# Patient Record
Sex: MALE | Race: WHITE | Employment: OTHER | URBAN - METROPOLITAN AREA
[De-identification: names, ages, dates, MRNs, and addresses within clinical notes are randomized per-mention and may not be internally consistent; named-entity substitution may affect disease eponyms.]

---

## 2017-04-20 PROBLEM — E78.2 MIXED HYPERLIPIDEMIA: Status: ACTIVE | Noted: 2017-04-20

## 2017-04-20 PROBLEM — M54.50 CHRONIC MIDLINE LOW BACK PAIN WITHOUT SCIATICA: Status: ACTIVE | Noted: 2017-04-20

## 2017-04-20 PROBLEM — G89.29 CHRONIC MIDLINE LOW BACK PAIN WITHOUT SCIATICA: Status: ACTIVE | Noted: 2017-04-20

## 2017-04-20 PROBLEM — E03.4 HYPOTHYROIDISM DUE TO ACQUIRED ATROPHY OF THYROID: Status: ACTIVE | Noted: 2017-04-20

## 2017-04-20 PROBLEM — N52.9 ERECTILE DYSFUNCTION: Status: ACTIVE | Noted: 2017-04-20

## 2017-07-15 PROBLEM — E11.42 TYPE 2 DIABETES MELLITUS WITH DIABETIC POLYNEUROPATHY, WITHOUT LONG-TERM CURRENT USE OF INSULIN (HCC): Status: ACTIVE | Noted: 2017-07-15

## 2018-05-09 ENCOUNTER — OFFICE VISIT (OUTPATIENT)
Dept: FAMILY MEDICINE CLINIC | Age: 78
End: 2018-05-09
Payer: MEDICARE

## 2018-05-09 VITALS
TEMPERATURE: 97.5 F | HEIGHT: 74 IN | RESPIRATION RATE: 18 BRPM | HEART RATE: 90 BPM | SYSTOLIC BLOOD PRESSURE: 134 MMHG | WEIGHT: 213.8 LBS | BODY MASS INDEX: 27.44 KG/M2 | DIASTOLIC BLOOD PRESSURE: 80 MMHG

## 2018-05-09 DIAGNOSIS — I10 BENIGN HYPERTENSION: ICD-10-CM

## 2018-05-09 DIAGNOSIS — N28.9 RENAL INSUFFICIENCY: ICD-10-CM

## 2018-05-09 DIAGNOSIS — E78.2 MIXED HYPERLIPIDEMIA: ICD-10-CM

## 2018-05-09 DIAGNOSIS — K76.0 FATTY LIVER: ICD-10-CM

## 2018-05-09 DIAGNOSIS — E11.42 TYPE 2 DIABETES MELLITUS WITH DIABETIC POLYNEUROPATHY, WITHOUT LONG-TERM CURRENT USE OF INSULIN (HCC): Primary | ICD-10-CM

## 2018-05-09 DIAGNOSIS — G89.29 CHRONIC MIDLINE LOW BACK PAIN WITHOUT SCIATICA: ICD-10-CM

## 2018-05-09 DIAGNOSIS — E03.4 HYPOTHYROIDISM DUE TO ACQUIRED ATROPHY OF THYROID: ICD-10-CM

## 2018-05-09 DIAGNOSIS — M54.50 CHRONIC MIDLINE LOW BACK PAIN WITHOUT SCIATICA: ICD-10-CM

## 2018-05-09 DIAGNOSIS — Z76.89 ESTABLISHING CARE WITH NEW DOCTOR, ENCOUNTER FOR: ICD-10-CM

## 2018-05-09 PROCEDURE — G8427 DOCREV CUR MEDS BY ELIG CLIN: HCPCS | Performed by: FAMILY MEDICINE

## 2018-05-09 PROCEDURE — 99203 OFFICE O/P NEW LOW 30 MIN: CPT | Performed by: FAMILY MEDICINE

## 2018-05-09 PROCEDURE — G8417 CALC BMI ABV UP PARAM F/U: HCPCS | Performed by: FAMILY MEDICINE

## 2018-05-09 PROCEDURE — 1123F ACP DISCUSS/DSCN MKR DOCD: CPT | Performed by: FAMILY MEDICINE

## 2018-05-09 PROCEDURE — 4040F PNEUMOC VAC/ADMIN/RCVD: CPT | Performed by: FAMILY MEDICINE

## 2018-05-09 PROCEDURE — 1036F TOBACCO NON-USER: CPT | Performed by: FAMILY MEDICINE

## 2018-05-09 RX ORDER — CALCIUM CARBONATE 500(1250)
500 TABLET ORAL DAILY
COMMUNITY

## 2018-05-09 ASSESSMENT — PATIENT HEALTH QUESTIONNAIRE - PHQ9
SUM OF ALL RESPONSES TO PHQ9 QUESTIONS 1 & 2: 0
2. FEELING DOWN, DEPRESSED OR HOPELESS: 0
SUM OF ALL RESPONSES TO PHQ QUESTIONS 1-9: 0
1. LITTLE INTEREST OR PLEASURE IN DOING THINGS: 0

## 2018-05-09 ASSESSMENT — ENCOUNTER SYMPTOMS
NAUSEA: 0
SHORTNESS OF BREATH: 0
EYES NEGATIVE: 1
ABDOMINAL PAIN: 0
CONSTIPATION: 0
COUGH: 0
DIARRHEA: 0

## 2018-05-10 DIAGNOSIS — E11.42 TYPE 2 DIABETES MELLITUS WITH DIABETIC POLYNEUROPATHY, WITHOUT LONG-TERM CURRENT USE OF INSULIN (HCC): ICD-10-CM

## 2018-05-10 DIAGNOSIS — E03.4 HYPOTHYROIDISM DUE TO ACQUIRED ATROPHY OF THYROID: ICD-10-CM

## 2018-05-10 DIAGNOSIS — I10 BENIGN HYPERTENSION: ICD-10-CM

## 2018-05-10 DIAGNOSIS — N28.9 RENAL INSUFFICIENCY: ICD-10-CM

## 2018-05-10 LAB
ALBUMIN SERPL-MCNC: 4.5 G/DL (ref 3.9–4.9)
ALP BLD-CCNC: 31 U/L (ref 35–104)
ALT SERPL-CCNC: 62 U/L (ref 0–41)
ANION GAP SERPL CALCULATED.3IONS-SCNC: 17 MEQ/L (ref 7–13)
AST SERPL-CCNC: 69 U/L (ref 0–40)
BASOPHILS ABSOLUTE: 0 K/UL (ref 0–0.2)
BASOPHILS RELATIVE PERCENT: 1 %
BILIRUB SERPL-MCNC: 0.9 MG/DL (ref 0–1.2)
BUN BLDV-MCNC: 30 MG/DL (ref 8–23)
CALCIUM SERPL-MCNC: 9.5 MG/DL (ref 8.6–10.2)
CHLORIDE BLD-SCNC: 97 MEQ/L (ref 98–107)
CHOLESTEROL, TOTAL: 234 MG/DL (ref 0–199)
CO2: 22 MEQ/L (ref 22–29)
CREAT SERPL-MCNC: 0.94 MG/DL (ref 0.7–1.2)
CREATININE URINE: 129.9 MG/DL
EOSINOPHILS ABSOLUTE: 0.3 K/UL (ref 0–0.7)
EOSINOPHILS RELATIVE PERCENT: 5.6 %
GFR AFRICAN AMERICAN: >60
GFR NON-AFRICAN AMERICAN: >60
GLOBULIN: 2.2 G/DL (ref 2.3–3.5)
GLUCOSE BLD-MCNC: 247 MG/DL (ref 74–109)
HBA1C MFR BLD: 9.5 % (ref 4.8–5.9)
HCT VFR BLD CALC: 40.5 % (ref 42–52)
HDLC SERPL-MCNC: 22 MG/DL (ref 40–59)
HEMOGLOBIN: 13.9 G/DL (ref 14–18)
LDL CHOLESTEROL CALCULATED: ABNORMAL MG/DL (ref 0–129)
LYMPHOCYTES ABSOLUTE: 1.6 K/UL (ref 1–4.8)
LYMPHOCYTES RELATIVE PERCENT: 31.6 %
MCH RBC QN AUTO: 31.3 PG (ref 27–31.3)
MCHC RBC AUTO-ENTMCNC: 34.4 % (ref 33–37)
MCV RBC AUTO: 91 FL (ref 80–100)
MICROALBUMIN UR-MCNC: <1.2 MG/DL
MICROALBUMIN/CREAT UR-RTO: NORMAL MG/G (ref 0–30)
MONOCYTES ABSOLUTE: 0.4 K/UL (ref 0.2–0.8)
MONOCYTES RELATIVE PERCENT: 8.8 %
NEUTROPHILS ABSOLUTE: 2.7 K/UL (ref 1.4–6.5)
NEUTROPHILS RELATIVE PERCENT: 53 %
PDW BLD-RTO: 13.3 % (ref 11.5–14.5)
PLATELET # BLD: 137 K/UL (ref 130–400)
POTASSIUM SERPL-SCNC: 4.8 MEQ/L (ref 3.5–5.1)
RBC # BLD: 4.45 M/UL (ref 4.7–6.1)
SODIUM BLD-SCNC: 136 MEQ/L (ref 132–144)
T4 FREE: 1.46 NG/DL (ref 0.93–1.7)
TOTAL PROTEIN: 6.7 G/DL (ref 6.4–8.1)
TRIGL SERPL-MCNC: 528 MG/DL (ref 0–200)
TSH SERPL DL<=0.05 MIU/L-ACNC: 3.91 UIU/ML (ref 0.27–4.2)
WBC # BLD: 5.1 K/UL (ref 4.8–10.8)

## 2018-05-11 RX ORDER — PIOGLITAZONEHYDROCHLORIDE 30 MG/1
30 TABLET ORAL DAILY
Qty: 30 TABLET | Refills: 3 | Status: SHIPPED | OUTPATIENT
Start: 2018-05-11 | End: 2018-06-27 | Stop reason: SDUPTHER

## 2018-05-18 RX ORDER — FENOFIBRATE 145 MG/1
145 TABLET, COATED ORAL DAILY
Qty: 90 TABLET | Refills: 1 | Status: SHIPPED | OUTPATIENT
Start: 2018-05-18 | End: 2018-08-24 | Stop reason: SDUPTHER

## 2018-05-29 RX ORDER — ALENDRONATE SODIUM 70 MG/1
70 TABLET ORAL
Qty: 12 TABLET | Refills: 0 | Status: SHIPPED | OUTPATIENT
Start: 2018-05-29 | End: 2018-08-22 | Stop reason: SDUPTHER

## 2018-06-12 ENCOUNTER — OFFICE VISIT (OUTPATIENT)
Dept: FAMILY MEDICINE CLINIC | Age: 78
End: 2018-06-12
Payer: MEDICARE

## 2018-06-12 VITALS
HEART RATE: 89 BPM | SYSTOLIC BLOOD PRESSURE: 134 MMHG | WEIGHT: 213.38 LBS | OXYGEN SATURATION: 97 % | HEIGHT: 74 IN | TEMPERATURE: 96.2 F | RESPIRATION RATE: 12 BRPM | DIASTOLIC BLOOD PRESSURE: 78 MMHG | BODY MASS INDEX: 27.39 KG/M2

## 2018-06-12 DIAGNOSIS — I10 BENIGN HYPERTENSION: ICD-10-CM

## 2018-06-12 DIAGNOSIS — E78.2 MIXED HYPERLIPIDEMIA: ICD-10-CM

## 2018-06-12 DIAGNOSIS — E11.42 TYPE 2 DIABETES MELLITUS WITH DIABETIC POLYNEUROPATHY, WITHOUT LONG-TERM CURRENT USE OF INSULIN (HCC): Primary | ICD-10-CM

## 2018-06-12 PROCEDURE — G8417 CALC BMI ABV UP PARAM F/U: HCPCS | Performed by: FAMILY MEDICINE

## 2018-06-12 PROCEDURE — G8427 DOCREV CUR MEDS BY ELIG CLIN: HCPCS | Performed by: FAMILY MEDICINE

## 2018-06-12 PROCEDURE — 1123F ACP DISCUSS/DSCN MKR DOCD: CPT | Performed by: FAMILY MEDICINE

## 2018-06-12 PROCEDURE — 1036F TOBACCO NON-USER: CPT | Performed by: FAMILY MEDICINE

## 2018-06-12 PROCEDURE — 99213 OFFICE O/P EST LOW 20 MIN: CPT | Performed by: FAMILY MEDICINE

## 2018-06-12 PROCEDURE — 4040F PNEUMOC VAC/ADMIN/RCVD: CPT | Performed by: FAMILY MEDICINE

## 2018-06-12 ASSESSMENT — ENCOUNTER SYMPTOMS
SHORTNESS OF BREATH: 0
NAUSEA: 0
CONSTIPATION: 0
EYES NEGATIVE: 1
COUGH: 0
ABDOMINAL PAIN: 0
DIARRHEA: 0

## 2018-06-27 RX ORDER — PIOGLITAZONEHYDROCHLORIDE 30 MG/1
30 TABLET ORAL DAILY
Qty: 90 TABLET | Refills: 1 | Status: SHIPPED | OUTPATIENT
Start: 2018-06-27 | End: 2018-12-17 | Stop reason: SDUPTHER

## 2018-06-27 RX ORDER — LEVOTHYROXINE SODIUM 0.07 MG/1
75 TABLET ORAL DAILY
Qty: 90 TABLET | Refills: 1 | Status: SHIPPED | OUTPATIENT
Start: 2018-06-27 | End: 2018-07-24 | Stop reason: SDUPTHER

## 2018-07-24 RX ORDER — LEVOTHYROXINE SODIUM 0.07 MG/1
75 TABLET ORAL DAILY
Qty: 90 TABLET | Refills: 1 | Status: SHIPPED | OUTPATIENT
Start: 2018-07-24 | End: 2019-03-27 | Stop reason: SDUPTHER

## 2018-07-24 NOTE — TELEPHONE ENCOUNTER
Medication:  Levothyroxine 75 mcg    Last Office Visit: 6/12/18    Last Labs: 5/10/18    Last Filled: 6/27/18          Preferred pharmacy [facility]

## 2018-08-01 ENCOUNTER — OFFICE VISIT (OUTPATIENT)
Dept: FAMILY MEDICINE CLINIC | Age: 78
End: 2018-08-01
Payer: MEDICARE

## 2018-08-01 VITALS
TEMPERATURE: 96.8 F | HEART RATE: 60 BPM | SYSTOLIC BLOOD PRESSURE: 110 MMHG | RESPIRATION RATE: 16 BRPM | HEIGHT: 74 IN | DIASTOLIC BLOOD PRESSURE: 70 MMHG | BODY MASS INDEX: 28.49 KG/M2 | WEIGHT: 222 LBS

## 2018-08-01 DIAGNOSIS — S39.012A STRAIN OF LUMBAR REGION, INITIAL ENCOUNTER: Primary | ICD-10-CM

## 2018-08-01 DIAGNOSIS — M54.50 CHRONIC MIDLINE LOW BACK PAIN WITHOUT SCIATICA: ICD-10-CM

## 2018-08-01 DIAGNOSIS — M15.9 PRIMARY OSTEOARTHRITIS INVOLVING MULTIPLE JOINTS: ICD-10-CM

## 2018-08-01 DIAGNOSIS — G89.29 CHRONIC MIDLINE LOW BACK PAIN WITHOUT SCIATICA: ICD-10-CM

## 2018-08-01 PROCEDURE — 4040F PNEUMOC VAC/ADMIN/RCVD: CPT | Performed by: FAMILY MEDICINE

## 2018-08-01 PROCEDURE — 1101F PT FALLS ASSESS-DOCD LE1/YR: CPT | Performed by: FAMILY MEDICINE

## 2018-08-01 PROCEDURE — 99213 OFFICE O/P EST LOW 20 MIN: CPT | Performed by: FAMILY MEDICINE

## 2018-08-01 PROCEDURE — G8427 DOCREV CUR MEDS BY ELIG CLIN: HCPCS | Performed by: FAMILY MEDICINE

## 2018-08-01 PROCEDURE — G8417 CALC BMI ABV UP PARAM F/U: HCPCS | Performed by: FAMILY MEDICINE

## 2018-08-01 PROCEDURE — 1123F ACP DISCUSS/DSCN MKR DOCD: CPT | Performed by: FAMILY MEDICINE

## 2018-08-01 PROCEDURE — 1036F TOBACCO NON-USER: CPT | Performed by: FAMILY MEDICINE

## 2018-08-01 RX ORDER — METHYLPREDNISOLONE 4 MG/1
TABLET ORAL
Qty: 1 KIT | Refills: 0 | Status: SHIPPED | OUTPATIENT
Start: 2018-08-01 | End: 2018-08-29 | Stop reason: ALTCHOICE

## 2018-08-01 ASSESSMENT — ENCOUNTER SYMPTOMS
COUGH: 0
DIARRHEA: 0
CONSTIPATION: 0
SHORTNESS OF BREATH: 0
ABDOMINAL PAIN: 0
EYES NEGATIVE: 1
NAUSEA: 0

## 2018-08-01 NOTE — PROGRESS NOTES
niacin 500 MG CR capsule Take 1,000 mg by mouth 2 times daily       metoprolol tartrate (LOPRESSOR) 25 MG tablet Take 1 tablet by mouth 2 times daily 60 tablet 0     No current facility-administered medications for this visit. PMH, Surgical Hx, Family Hx, and Social Hx reviewed and updated. Health Maintenance reviewed. Objective    Vitals:    08/01/18 1120   BP: 110/70   Pulse: 60   Resp: 16   Temp: 96.8 °F (36 °C)   TempSrc: Temporal   Weight: 222 lb (100.7 kg)   Height: 6' 2\" (1.88 m)     Physical Exam   Neck: Normal range of motion. Neck supple. Cardiovascular: Normal rate, regular rhythm and normal heart sounds. No murmur heard. Pulmonary/Chest: Effort normal and breath sounds normal. He has no wheezes. Abdominal: Soft. Bowel sounds are normal. There is no tenderness. Musculoskeletal: He exhibits no edema. Lumbar back: He exhibits pain. Low-back pain with range of motion. Says it increases when he stands for long periods of time. No significant radiculopathy. Neurological: He has normal reflexes. No numbness or tingling at this time       Assessment & Plan    Diagnosis Orders   1. Strain of lumbar region, initial encounter  methylPREDNISolone (MEDROL DOSEPACK) 4 MG tablet   2. Primary osteoarthritis involving multiple joints  methylPREDNISolone (MEDROL DOSEPACK) 4 MG tablet   3. Chronic midline low back pain without sciatica       No orders of the defined types were placed in this encounter. Orders Placed This Encounter   Medications    methylPREDNISolone (MEDROL DOSEPACK) 4 MG tablet     Sig: Take by mouth. Dispense:  1 kit     Refill:  0     There are no discontinued medications. No Follow-up on file. Long talk. Avoid activities that exacerbate symptoms  The importance of a good diet and exercise regimen discussed. Take medications as prescribed. Talked about going to physical therapy but he wishes to hold off. Range of motion exercises reviewed.   Follow up as instructed. Call if any problems           Lorena Briseno MD          Please note this report has been partially produced using speech recognition software  And may cause contain errors related to that system including grammar, punctuation and spelling as well as words and phrases that may seem inappropriate. If there are questions or concerns please feel free to contact me to clarify.

## 2018-08-22 RX ORDER — ALENDRONATE SODIUM 70 MG/1
TABLET ORAL
Qty: 12 TABLET | Refills: 1 | Status: SHIPPED | OUTPATIENT
Start: 2018-08-22 | End: 2019-02-06 | Stop reason: SDUPTHER

## 2018-08-24 RX ORDER — FENOFIBRATE 145 MG/1
145 TABLET, COATED ORAL DAILY
Qty: 90 TABLET | Refills: 1 | Status: SHIPPED | OUTPATIENT
Start: 2018-08-24

## 2018-08-24 NOTE — TELEPHONE ENCOUNTER
Medication: Fenofibrate 145 mg    Last Office Visit: 8/1/18    Last Labs: 5/10/18    Last Filled: 5/18/18      Medication: Metoprolol tartrate 25 mg    Last Office Visit: 8/1/18    Last Labs: 5/10/18    Last Filled: 3/20/18        Preferred pharmacy [facility]

## 2018-08-29 ENCOUNTER — OFFICE VISIT (OUTPATIENT)
Dept: FAMILY MEDICINE CLINIC | Age: 78
End: 2018-08-29
Payer: MEDICARE

## 2018-08-29 VITALS
RESPIRATION RATE: 16 BRPM | BODY MASS INDEX: 28.52 KG/M2 | SYSTOLIC BLOOD PRESSURE: 136 MMHG | DIASTOLIC BLOOD PRESSURE: 86 MMHG | TEMPERATURE: 96.4 F | WEIGHT: 222.2 LBS | HEIGHT: 74 IN | HEART RATE: 82 BPM

## 2018-08-29 DIAGNOSIS — E11.42 TYPE 2 DIABETES MELLITUS WITH DIABETIC POLYNEUROPATHY, WITHOUT LONG-TERM CURRENT USE OF INSULIN (HCC): ICD-10-CM

## 2018-08-29 DIAGNOSIS — G89.29 CHRONIC MIDLINE LOW BACK PAIN WITHOUT SCIATICA: Primary | ICD-10-CM

## 2018-08-29 DIAGNOSIS — M54.32 SCIATICA OF LEFT SIDE: ICD-10-CM

## 2018-08-29 DIAGNOSIS — M54.50 CHRONIC MIDLINE LOW BACK PAIN WITHOUT SCIATICA: Primary | ICD-10-CM

## 2018-08-29 DIAGNOSIS — G89.29 CHRONIC MIDLINE LOW BACK PAIN WITHOUT SCIATICA: ICD-10-CM

## 2018-08-29 DIAGNOSIS — M54.50 CHRONIC MIDLINE LOW BACK PAIN WITHOUT SCIATICA: ICD-10-CM

## 2018-08-29 PROCEDURE — 4040F PNEUMOC VAC/ADMIN/RCVD: CPT | Performed by: FAMILY MEDICINE

## 2018-08-29 PROCEDURE — 1123F ACP DISCUSS/DSCN MKR DOCD: CPT | Performed by: FAMILY MEDICINE

## 2018-08-29 PROCEDURE — 1101F PT FALLS ASSESS-DOCD LE1/YR: CPT | Performed by: FAMILY MEDICINE

## 2018-08-29 PROCEDURE — 1036F TOBACCO NON-USER: CPT | Performed by: FAMILY MEDICINE

## 2018-08-29 PROCEDURE — G8427 DOCREV CUR MEDS BY ELIG CLIN: HCPCS | Performed by: FAMILY MEDICINE

## 2018-08-29 PROCEDURE — 99213 OFFICE O/P EST LOW 20 MIN: CPT | Performed by: FAMILY MEDICINE

## 2018-08-29 PROCEDURE — G8417 CALC BMI ABV UP PARAM F/U: HCPCS | Performed by: FAMILY MEDICINE

## 2018-08-29 RX ORDER — PREDNISONE 10 MG/1
TABLET ORAL
Qty: 18 TABLET | Refills: 0 | Status: SHIPPED | OUTPATIENT
Start: 2018-08-29 | End: 2018-08-29 | Stop reason: SDUPTHER

## 2018-08-29 RX ORDER — PREDNISONE 10 MG/1
TABLET ORAL
Qty: 18 TABLET | Refills: 0 | Status: SHIPPED | OUTPATIENT
Start: 2018-08-29 | End: 2018-09-08

## 2018-08-29 ASSESSMENT — ENCOUNTER SYMPTOMS
CONSTIPATION: 0
SHORTNESS OF BREATH: 0
EYES NEGATIVE: 1
COUGH: 0
ABDOMINAL PAIN: 0
DIARRHEA: 0
NAUSEA: 0

## 2018-08-29 NOTE — PROGRESS NOTES
Pain Medicine     Number of Visits Requested:   1     Orders Placed This Encounter   Medications    predniSONE (DELTASONE) 10 MG tablet     Sig: TID FOR 5 DAYS THEN 1 DAILY FOR 3 DAYS     Dispense:  18 tablet     Refill:  0     Medications Discontinued During This Encounter   Medication Reason    methylPREDNISolone (MEDROL DOSEPACK) 4 MG tablet Therapy completed     No Follow-up on file. Long talk. Has been to physical therapy in the past.  Past x-rays reviewed with patient  The importance of a good diet and exercise regimen discussed. Take medications as prescribed. Treatment options reviewed. Wishes to start with pain management  Follow up as instructed. Call if any problems           Alvina Mccray MD          Please note this report has been partially produced using speech recognition software  And may cause contain errors related to that system including grammar, punctuation and spelling as well as words and phrases that may seem inappropriate. If there are questions or concerns please feel free to contact me to clarify.

## 2018-09-25 ENCOUNTER — OFFICE VISIT (OUTPATIENT)
Dept: FAMILY MEDICINE CLINIC | Age: 78
End: 2018-09-25
Payer: MEDICARE

## 2018-09-25 VITALS
SYSTOLIC BLOOD PRESSURE: 108 MMHG | WEIGHT: 223.6 LBS | DIASTOLIC BLOOD PRESSURE: 66 MMHG | RESPIRATION RATE: 12 BRPM | HEIGHT: 74 IN | HEART RATE: 56 BPM | TEMPERATURE: 96.7 F | BODY MASS INDEX: 28.7 KG/M2

## 2018-09-25 DIAGNOSIS — G89.29 CHRONIC MIDLINE LOW BACK PAIN WITHOUT SCIATICA: ICD-10-CM

## 2018-09-25 DIAGNOSIS — Z23 NEED FOR INFLUENZA VACCINATION: ICD-10-CM

## 2018-09-25 DIAGNOSIS — M54.50 CHRONIC MIDLINE LOW BACK PAIN WITHOUT SCIATICA: ICD-10-CM

## 2018-09-25 DIAGNOSIS — N28.9 RENAL INSUFFICIENCY: ICD-10-CM

## 2018-09-25 DIAGNOSIS — E11.42 TYPE 2 DIABETES MELLITUS WITH DIABETIC POLYNEUROPATHY, WITHOUT LONG-TERM CURRENT USE OF INSULIN (HCC): Primary | ICD-10-CM

## 2018-09-25 LAB — HBA1C MFR BLD: 6.1 %

## 2018-09-25 PROCEDURE — G0008 ADMIN INFLUENZA VIRUS VAC: HCPCS | Performed by: FAMILY MEDICINE

## 2018-09-25 PROCEDURE — 83036 HEMOGLOBIN GLYCOSYLATED A1C: CPT | Performed by: FAMILY MEDICINE

## 2018-09-25 PROCEDURE — 1123F ACP DISCUSS/DSCN MKR DOCD: CPT | Performed by: FAMILY MEDICINE

## 2018-09-25 PROCEDURE — G8417 CALC BMI ABV UP PARAM F/U: HCPCS | Performed by: FAMILY MEDICINE

## 2018-09-25 PROCEDURE — 99213 OFFICE O/P EST LOW 20 MIN: CPT | Performed by: FAMILY MEDICINE

## 2018-09-25 PROCEDURE — 4040F PNEUMOC VAC/ADMIN/RCVD: CPT | Performed by: FAMILY MEDICINE

## 2018-09-25 PROCEDURE — 90662 IIV NO PRSV INCREASED AG IM: CPT | Performed by: FAMILY MEDICINE

## 2018-09-25 PROCEDURE — 1036F TOBACCO NON-USER: CPT | Performed by: FAMILY MEDICINE

## 2018-09-25 PROCEDURE — 1101F PT FALLS ASSESS-DOCD LE1/YR: CPT | Performed by: FAMILY MEDICINE

## 2018-09-25 PROCEDURE — G8427 DOCREV CUR MEDS BY ELIG CLIN: HCPCS | Performed by: FAMILY MEDICINE

## 2018-09-25 ASSESSMENT — ENCOUNTER SYMPTOMS
EYES NEGATIVE: 1
SHORTNESS OF BREATH: 0
NAUSEA: 0
DIARRHEA: 0
COUGH: 0
CONSTIPATION: 0
ABDOMINAL PAIN: 0

## 2018-09-25 NOTE — PROGRESS NOTES
Subjective  Young Ferdinand, 66 y.o. male presents today with:  Chief Complaint   Patient presents with    Back Pain     Patient presents today for a follow-up on Back Pain that radiates into his hips, and legs. Was seen by Dr. Maryam Collins, and received Injections. States it seemed like a \"miracle\", but is slowly coming back now. HPI    Patient presents today Back pain. Saw pain management. Injection has helped quite a bit. Taking current medications which were reviewed. Problem list discussed. Blood sugar control has improved. Had 1 low blood sugar reading the past few weeks. Toxo cutting back on his Amaryl. Overall doing well. Has no other new problem / question. Health Maintenance Is Up-To-Date         No other questions and or concerns for today's visit      Review of Systems   Constitutional: Negative for activity change, appetite change, fatigue and fever. HENT: Negative for ear pain. Eyes: Negative. Respiratory: Negative for cough and shortness of breath. Cardiovascular: Negative for chest pain and palpitations. Gastrointestinal: Negative for abdominal pain, constipation, diarrhea and nausea. Genitourinary: Negative for dysuria and frequency. Neurological: Negative for dizziness and headaches.          Past Medical History:   Diagnosis Date    Abnormal LFTs     BPH without urinary obstruction     Cancer (HCC)     skin cancer, rt side of neck excised    Colonic polyp     Essential hypertension, benign     Gout     Hyperlipidemia     Non-alcoholic fatty liver disease     Peripheral neuropathy     Renal insufficiency     Tinnitus     Type II or unspecified type diabetes mellitus without mention of complication, not stated as uncontrolled      Past Surgical History:   Procedure Laterality Date    APPENDECTOMY      COLONOSCOPY  11 12 2013    colon polyps, diverticulosis, hemorrhoids    FRACTURE SURGERY      rt femur    HERNIA REPAIR      inguinal hernia tablet Take 1 tablet by mouth 2 times daily 180 tablet 0     No current facility-administered medications for this visit. PMH, Surgical Hx, Family Hx, and Social Hx reviewed and updated. Health Maintenance reviewed. Objective    Vitals:    09/25/18 1050   BP: 108/66   Pulse: 56   Resp: 12   Temp: 96.7 °F (35.9 °C)   TempSrc: Temporal   Weight: 223 lb 9.6 oz (101.4 kg)   Height: 6' 2\" (1.88 m)       Physical Exam   HENT:   Nose: Nose normal.   Mouth/Throat: Oropharynx is clear and moist.   Eyes: Pupils are equal, round, and reactive to light. Conjunctivae are normal.   Neck: Normal range of motion. Neck supple. Cardiovascular: Normal rate, regular rhythm and normal heart sounds. No murmur heard. Pulmonary/Chest: Effort normal and breath sounds normal. He has no wheezes. Abdominal: Soft. Bowel sounds are normal. He exhibits no mass. There is no tenderness. Musculoskeletal: He exhibits no edema. Lumbar back: He exhibits pain. Lymphadenopathy:     He has no cervical adenopathy. Neurological: He has normal reflexes. Assessment & Plan    Diagnosis Orders   1. Type 2 diabetes mellitus with diabetic polyneuropathy, without long-term current use of insulin (HCC)  POCT glycosylated hemoglobin (Hb A1C)   2. Need for influenza vaccination  INFLUENZA, HIGH DOSE, 65 YRS +, IM, PF, PREFILL SYR, 0.5ML (FLUZONE HD)   3. Renal insufficiency     4. Chronic midline low back pain without sciatica       Orders Placed This Encounter   Procedures    INFLUENZA, HIGH DOSE, 65 YRS +, IM, PF, PREFILL SYR, 0.5ML (FLUZONE HD)    POCT glycosylated hemoglobin (Hb A1C)     No orders of the defined types were placed in this encounter. There are no discontinued medications. No Follow-up on file. Long talk. The importance of a good diet and exercise regimen discussed. Take medications as prescribed.   We'll decrease Amaryl to 2 pills daily  Continue to monitor blood sugar  Continue to follow-up with

## 2018-12-17 RX ORDER — PIOGLITAZONEHYDROCHLORIDE 30 MG/1
TABLET ORAL
Qty: 90 TABLET | Refills: 1 | Status: SHIPPED | OUTPATIENT
Start: 2018-12-17

## 2019-02-06 RX ORDER — ALENDRONATE SODIUM 70 MG/1
TABLET ORAL
Qty: 12 TABLET | Refills: 1 | Status: SHIPPED | OUTPATIENT
Start: 2019-02-06

## 2019-02-27 ENCOUNTER — OFFICE VISIT (OUTPATIENT)
Dept: FAMILY MEDICINE CLINIC | Age: 79
End: 2019-02-27
Payer: MEDICARE

## 2019-02-27 VITALS
WEIGHT: 227 LBS | TEMPERATURE: 98 F | HEART RATE: 84 BPM | RESPIRATION RATE: 16 BRPM | BODY MASS INDEX: 28.23 KG/M2 | OXYGEN SATURATION: 98 % | SYSTOLIC BLOOD PRESSURE: 138 MMHG | HEIGHT: 75 IN | DIASTOLIC BLOOD PRESSURE: 68 MMHG

## 2019-02-27 DIAGNOSIS — M54.50 CHRONIC MIDLINE LOW BACK PAIN WITHOUT SCIATICA: Primary | ICD-10-CM

## 2019-02-27 DIAGNOSIS — I10 BENIGN HYPERTENSION: ICD-10-CM

## 2019-02-27 DIAGNOSIS — E11.42 TYPE 2 DIABETES MELLITUS WITH DIABETIC POLYNEUROPATHY, WITHOUT LONG-TERM CURRENT USE OF INSULIN (HCC): ICD-10-CM

## 2019-02-27 DIAGNOSIS — G89.29 CHRONIC MIDLINE LOW BACK PAIN WITHOUT SCIATICA: Primary | ICD-10-CM

## 2019-02-27 DIAGNOSIS — E03.4 HYPOTHYROIDISM DUE TO ACQUIRED ATROPHY OF THYROID: ICD-10-CM

## 2019-02-27 DIAGNOSIS — M15.9 PRIMARY OSTEOARTHRITIS INVOLVING MULTIPLE JOINTS: ICD-10-CM

## 2019-02-27 PROCEDURE — 4040F PNEUMOC VAC/ADMIN/RCVD: CPT | Performed by: FAMILY MEDICINE

## 2019-02-27 PROCEDURE — 1123F ACP DISCUSS/DSCN MKR DOCD: CPT | Performed by: FAMILY MEDICINE

## 2019-02-27 PROCEDURE — G8427 DOCREV CUR MEDS BY ELIG CLIN: HCPCS | Performed by: FAMILY MEDICINE

## 2019-02-27 PROCEDURE — 1101F PT FALLS ASSESS-DOCD LE1/YR: CPT | Performed by: FAMILY MEDICINE

## 2019-02-27 PROCEDURE — 1036F TOBACCO NON-USER: CPT | Performed by: FAMILY MEDICINE

## 2019-02-27 PROCEDURE — G8482 FLU IMMUNIZE ORDER/ADMIN: HCPCS | Performed by: FAMILY MEDICINE

## 2019-02-27 PROCEDURE — 99213 OFFICE O/P EST LOW 20 MIN: CPT | Performed by: FAMILY MEDICINE

## 2019-02-27 PROCEDURE — G8417 CALC BMI ABV UP PARAM F/U: HCPCS | Performed by: FAMILY MEDICINE

## 2019-02-27 RX ORDER — DICLOFENAC SODIUM 75 MG/1
75 TABLET, DELAYED RELEASE ORAL 2 TIMES DAILY
Qty: 60 TABLET | Refills: 3 | Status: SHIPPED | OUTPATIENT
Start: 2019-02-27

## 2019-02-27 ASSESSMENT — PATIENT HEALTH QUESTIONNAIRE - PHQ9
2. FEELING DOWN, DEPRESSED OR HOPELESS: 0
SUM OF ALL RESPONSES TO PHQ QUESTIONS 1-9: 0
1. LITTLE INTEREST OR PLEASURE IN DOING THINGS: 0
SUM OF ALL RESPONSES TO PHQ9 QUESTIONS 1 & 2: 0
SUM OF ALL RESPONSES TO PHQ QUESTIONS 1-9: 0

## 2019-03-27 RX ORDER — LEVOTHYROXINE SODIUM 0.07 MG/1
TABLET ORAL
Qty: 90 TABLET | Refills: 1 | Status: SHIPPED | OUTPATIENT
Start: 2019-03-27

## 2019-05-16 ENCOUNTER — TELEPHONE (OUTPATIENT)
Dept: ADMINISTRATIVE | Age: 79
End: 2019-05-16

## 2023-03-29 DIAGNOSIS — E03.9 HYPOTHYROIDISM, UNSPECIFIED TYPE: ICD-10-CM

## 2023-03-29 DIAGNOSIS — N40.0 BENIGN PROSTATIC HYPERPLASIA, UNSPECIFIED WHETHER LOWER URINARY TRACT SYMPTOMS PRESENT: ICD-10-CM

## 2023-03-29 RX ORDER — LEVOTHYROXINE SODIUM 88 UG/1
88 TABLET ORAL DAILY
Qty: 90 TABLET | Refills: 0 | Status: SHIPPED | OUTPATIENT
Start: 2023-03-29 | End: 2023-07-21

## 2023-03-29 RX ORDER — TAMSULOSIN HYDROCHLORIDE 0.4 MG/1
0.4 CAPSULE ORAL DAILY
Qty: 90 CAPSULE | Refills: 0 | Status: SHIPPED | OUTPATIENT
Start: 2023-03-29 | End: 2023-07-21

## 2023-03-29 RX ORDER — TAMSULOSIN HYDROCHLORIDE 0.4 MG/1
0.4 CAPSULE ORAL DAILY
COMMUNITY
Start: 2018-04-20 | End: 2023-03-29 | Stop reason: SDUPTHER

## 2023-03-29 RX ORDER — LEVOTHYROXINE SODIUM 88 UG/1
1 TABLET ORAL DAILY
COMMUNITY
Start: 2018-07-24 | End: 2023-03-29 | Stop reason: SDUPTHER

## 2023-07-11 DIAGNOSIS — N40.0 BENIGN PROSTATIC HYPERPLASIA, UNSPECIFIED WHETHER LOWER URINARY TRACT SYMPTOMS PRESENT: ICD-10-CM

## 2023-07-11 DIAGNOSIS — E03.9 HYPOTHYROIDISM, UNSPECIFIED TYPE: ICD-10-CM

## 2023-07-11 NOTE — TELEPHONE ENCOUNTER
LOV: 1/3/2023  Pt needs appt for further refills  Please ask if a short supply is needed  Thank you!

## 2023-07-21 RX ORDER — LEVOTHYROXINE SODIUM 88 UG/1
88 TABLET ORAL DAILY
Qty: 30 TABLET | Refills: 0 | Status: SHIPPED | OUTPATIENT
Start: 2023-07-21 | End: 2023-08-15 | Stop reason: SDUPTHER

## 2023-07-21 RX ORDER — TAMSULOSIN HYDROCHLORIDE 0.4 MG/1
0.4 CAPSULE ORAL DAILY
Qty: 30 CAPSULE | Refills: 0 | Status: SHIPPED | OUTPATIENT
Start: 2023-07-21 | End: 2023-08-15 | Stop reason: ALTCHOICE

## 2023-07-21 NOTE — TELEPHONE ENCOUNTER
SPOKE WITH PATIENT - SCHEDULED APPOINTMENT WITH DR. REEVES ON 8/7/23 - NEEDS SHORT SUPPLY SENT TO [facility].

## 2023-08-07 ENCOUNTER — APPOINTMENT (OUTPATIENT)
Dept: PRIMARY CARE | Facility: CLINIC | Age: 83
End: 2023-08-07
Payer: MEDICARE

## 2023-08-14 PROBLEM — E03.4 HYPOTHYROIDISM DUE TO ACQUIRED ATROPHY OF THYROID: Status: ACTIVE | Noted: 2017-04-20

## 2023-08-14 PROBLEM — M15.9 PRIMARY OSTEOARTHRITIS INVOLVING MULTIPLE JOINTS: Status: ACTIVE | Noted: 2023-08-14

## 2023-08-14 PROBLEM — F51.01 PRIMARY INSOMNIA: Status: ACTIVE | Noted: 2023-08-14

## 2023-08-14 PROBLEM — G57.92 NEURITIS OF LEFT FOOT: Status: ACTIVE | Noted: 2023-08-14

## 2023-08-14 PROBLEM — M62.838 MUSCLE SPASMS OF NECK: Status: ACTIVE | Noted: 2023-08-14

## 2023-08-14 PROBLEM — G57.91 NEURITIS OF RIGHT FOOT: Status: ACTIVE | Noted: 2023-08-14

## 2023-08-14 PROBLEM — M15.0 PRIMARY OSTEOARTHRITIS INVOLVING MULTIPLE JOINTS: Status: ACTIVE | Noted: 2023-08-14

## 2023-08-14 PROBLEM — I95.1 PRIMARY ORTHOSTATIC HYPOTENSION: Status: ACTIVE | Noted: 2023-08-14

## 2023-08-14 PROBLEM — G89.29 CHRONIC MIDLINE LOW BACK PAIN WITHOUT SCIATICA: Status: ACTIVE | Noted: 2017-04-20

## 2023-08-14 PROBLEM — F41.9 ANXIETY: Status: ACTIVE | Noted: 2023-08-14

## 2023-08-14 PROBLEM — G47.9 DIFFICULTY SLEEPING: Status: ACTIVE | Noted: 2023-08-14

## 2023-08-14 PROBLEM — E11.22 CKD STAGE 1 DUE TO TYPE 2 DIABETES MELLITUS (MULTI): Status: ACTIVE | Noted: 2023-08-14

## 2023-08-14 PROBLEM — E11.9 TYPE 2 DIABETES MELLITUS WITHOUT COMPLICATION, WITHOUT LONG-TERM CURRENT USE OF INSULIN (MULTI): Status: ACTIVE | Noted: 2023-08-14

## 2023-08-14 PROBLEM — R26.9 GAIT DISTURBANCE: Status: ACTIVE | Noted: 2023-08-14

## 2023-08-14 PROBLEM — N52.9 ERECTILE DYSFUNCTION: Status: ACTIVE | Noted: 2017-04-20

## 2023-08-14 PROBLEM — R42 DIZZINESS: Status: ACTIVE | Noted: 2023-08-14

## 2023-08-14 PROBLEM — I10 ESSENTIAL HYPERTENSION: Status: ACTIVE | Noted: 2023-08-14

## 2023-08-14 PROBLEM — G25.0 ESSENTIAL TREMOR: Status: ACTIVE | Noted: 2023-08-14

## 2023-08-14 PROBLEM — N18.1 CKD STAGE 1 DUE TO TYPE 2 DIABETES MELLITUS (MULTI): Status: ACTIVE | Noted: 2023-08-14

## 2023-08-14 PROBLEM — R97.20 ELEVATED PSA: Status: ACTIVE | Noted: 2023-08-14

## 2023-08-14 PROBLEM — M54.50 CHRONIC MIDLINE LOW BACK PAIN WITHOUT SCIATICA: Status: ACTIVE | Noted: 2017-04-20

## 2023-08-14 PROBLEM — M54.2 CERVICALGIA: Status: ACTIVE | Noted: 2023-08-14

## 2023-08-14 PROBLEM — Z96.652 STATUS POST TOTAL LEFT KNEE REPLACEMENT: Status: ACTIVE | Noted: 2023-08-14

## 2023-08-14 PROBLEM — M81.0 OSTEOPOROSIS: Status: ACTIVE | Noted: 2023-08-14

## 2023-08-14 RX ORDER — PIOGLITAZONEHYDROCHLORIDE 15 MG/1
1 TABLET ORAL DAILY
COMMUNITY
Start: 2019-09-17 | End: 2023-11-02 | Stop reason: WASHOUT

## 2023-08-14 RX ORDER — FENOFIBRATE 145 MG/1
1 TABLET, FILM COATED ORAL EVERY OTHER DAY
COMMUNITY
Start: 2018-08-24 | End: 2023-08-28

## 2023-08-14 RX ORDER — GABAPENTIN 100 MG/1
1 CAPSULE ORAL 3 TIMES DAILY
COMMUNITY
Start: 2022-02-21 | End: 2023-08-15 | Stop reason: ALTCHOICE

## 2023-08-14 RX ORDER — VALSARTAN 80 MG/1
1 TABLET ORAL DAILY
COMMUNITY
Start: 2022-06-03 | End: 2023-08-15 | Stop reason: ALTCHOICE

## 2023-08-14 RX ORDER — TRAZODONE HYDROCHLORIDE 50 MG/1
50 TABLET ORAL NIGHTLY
COMMUNITY
End: 2023-08-15 | Stop reason: SDUPTHER

## 2023-08-14 RX ORDER — FAMOTIDINE 20 MG/1
1 TABLET, FILM COATED ORAL DAILY
COMMUNITY
Start: 2022-06-28 | End: 2023-11-02 | Stop reason: WASHOUT

## 2023-08-14 NOTE — PROGRESS NOTES
Subjective   Patient ID: Dakota Manzano is a 83 y.o. male who presents for Hypertension, Hyperlipidemia, Medicare Annual Wellness Visit Subsequent, Diabetes, Shaking, Dizziness, cancer spots, and feet numbness.  HPI  Patient presents today for a Medicare AWV, and follow-up on Diabetes, Hypertension, and Hyperlipidemia. Does follow a low sugar, low sodium, and low fat diet. Does check his sugar at home, when he last checked it was 115. Is fasting for blood work.    Patient also presents in office today for his feet. Admits to losing feeling in his feet. Ongoing for a while. Admits that he has been to a podiatrist and this did not help. Admits that he try compression socks with no help. Was also prescribed gabapentin and this did not help either.     Patient in office being seen for a follow up on Insomnia. Currently taking trazodone. Last took last night. Reports that the medication is working 8/10. 80% managed with the medication. States there are no adverse effects.     Also presents in office today for dizziness. Admits that this has been ongoing for a while. Admits that he has been off his losartan for a while. Admits that when he stands up he does have to use his hands when he is standing up. Admits that when he was at [employer] his blood pressure changed so bad that he fell and passed out. Admits that this continues to happen. Admits that this has been happening since he was taken off the BP medication.     Would like to talk about shaking today. Ongoing for a while. Admits that this has gotten worse.     Also presents in office today for cancer spots. Admits that he has some located on his neck. Admits that this keeps getting caught on his clothes. Admits that it has been tearing and bleeding a lot. Admits that they would like to see a dermatologist to have this removed.     Also presents in office today for his memory. Admits that this has been getting worse within the last couple of months. Admits that he did  "see a neurologist. Admits that they would like to see another one.     Would like to talk about medications today.     Taking current medications which were reviewed.  Problem list discussed.    Overall doing well.  Eating okay.  Staying active.    Has no other new problem /question.    ROS  Constitutional- No activity change. No appetite change.  Eyes- Denies vision changes.  Respiratory- No shortness of breath.  Cardiovascular- No palpitations. No chest pain.  GI- No nausea or vomiting. No diarrhea or constipation. Denies abdominal pain.  Musculoskeletal- Denies joint swelling.  Extremities- No edema.  Neurological-weakness, dizziness.    Skin- spots    Psychiatric/Behavioral- Denies significant anxiety, or depressed mood.     Objective     /68   Pulse 70   Temp 36.6 °C (97.9 °F) (Temporal)   Resp 18   Ht 1.88 m (6' 2\")   Wt 95.9 kg (211 lb 6.4 oz)   SpO2 98%   BMI 27.14 kg/m²     Allergies   Allergen Reactions    Tetanus Vaccines And Toxoid Unknown       Constitutional-- Well-nourished.  No distress  Head- unremarkable.  Ears- TMs clear.  Eyes- PERRL.  Conjunctiva normal.  Nose- Normal.  No rhinorrhea noted.  Throat- Oropharynx is clear and moist.  Neck- Supple with no thyromegaly.  No significant cervical adenopathy noted.  Pulmonary/Chest- Breath sounds normal with normal effort.  No wheezing.  Heart- Regular rate and rhythm.  No murmur.  Abdomen- Soft and non-tender.  No masses noted.  Musculoskeletal- Normal ROM.  No significant joint swelling  Extremities- No edema.   Neurological- Alert.  No noted deficits.  Skin- Warm.  No rashes.  Psychiatric/Behavioral- Mood and affect normal.  Behavior normal.     Assessment/Plan   1. Medicare annual wellness visit, subsequent        2. Dizziness  Referral to Neurology    Referral to Cardiology    Referral to ENT    MR brain wo IV contrast    MR brain wo IV contrast      3. Mixed hyperlipidemia  Lipid Panel      4. Essential hypertension  CBC and Auto " Differential    Comprehensive Metabolic Panel      5. Benign prostatic hyperplasia without lower urinary tract symptoms  Prostate Specific Antigen, Screen      6. Primary insomnia  traZODone (Desyrel) 50 mg tablet      7. Type 2 diabetes mellitus without complication, without long-term current use of insulin (CMS/[facility])  Hemoglobin A1C      8. Skin spots, red  Referral to Dermatology    Referral to Dermatology      9. Hypothyroidism, unspecified type  Thyroid Stimulating Hormone    Thyroxine, Free    DISCONTINUED: levothyroxine (Synthroid, Levoxyl) 88 mcg tablet      10. 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 Vitamin D deficiency  Vitamin D, Total      12. Balance problem  MR brain wo IV contrast    MR brain wo IV contrast      13. Memory loss  MR brain wo IV contrast    MR brain wo IV contrast             Long talk. Treatment options reviewed.  Spent 30 minutes with the patient, with at least 1/2 of the time spent in counseling and instruction.    Medicare wellness questionnaire reviewed in detail.   No problems with activities of daily living. Home safety issues reviewed.   Reminded to have an updated will if needed.    Long talk about possibilities contributing to his dizziness balance and syncope issues.  Talk about the importance of drinking plenty of water during the day.  Referral placed with Neurologist, Cardiologist,  ENT, and Dermatology.    Discussed memory concerns.  Advised patient on the importance of mental exercises.    Complete MRI imaging of the brain.    Continue and take your medications as prescribed.    Health Maintenance issues discussed.    Importance of healthy diet and regular exercise regimen discussed.    We will contact you with any test results ordered. If you do not hear from us, please contact.    Follow-up as instructed or sooner if any problems or symptoms do not resolve as expected.      Scribe Attestation  By signing my name below, Corina BERUMEN Scribe    attest that this documentation has been prepared under the direction and in the presence of Matty Norton MD.

## 2023-08-15 ENCOUNTER — LAB (OUTPATIENT)
Dept: LAB | Facility: LAB | Age: 83
End: 2023-08-15
Payer: MEDICARE

## 2023-08-15 ENCOUNTER — OFFICE VISIT (OUTPATIENT)
Dept: PRIMARY CARE | Facility: CLINIC | Age: 83
End: 2023-08-15
Payer: MEDICARE

## 2023-08-15 VITALS
HEIGHT: 74 IN | TEMPERATURE: 97.9 F | OXYGEN SATURATION: 98 % | DIASTOLIC BLOOD PRESSURE: 68 MMHG | RESPIRATION RATE: 18 BRPM | BODY MASS INDEX: 27.13 KG/M2 | SYSTOLIC BLOOD PRESSURE: 142 MMHG | WEIGHT: 211.4 LBS | HEART RATE: 70 BPM

## 2023-08-15 DIAGNOSIS — I10 ESSENTIAL HYPERTENSION: ICD-10-CM

## 2023-08-15 DIAGNOSIS — N40.0 BENIGN PROSTATIC HYPERPLASIA WITHOUT LOWER URINARY TRACT SYMPTOMS: ICD-10-CM

## 2023-08-15 DIAGNOSIS — E55.9 VITAMIN D DEFICIENCY: ICD-10-CM

## 2023-08-15 DIAGNOSIS — F51.01 PRIMARY INSOMNIA: ICD-10-CM

## 2023-08-15 DIAGNOSIS — N18.1 CKD STAGE 1 DUE TO TYPE 2 DIABETES MELLITUS (MULTI): ICD-10-CM

## 2023-08-15 DIAGNOSIS — E78.2 MIXED HYPERLIPIDEMIA: ICD-10-CM

## 2023-08-15 DIAGNOSIS — Z00.00 MEDICARE ANNUAL WELLNESS VISIT, SUBSEQUENT: Primary | ICD-10-CM

## 2023-08-15 DIAGNOSIS — R41.3 MEMORY LOSS: ICD-10-CM

## 2023-08-15 DIAGNOSIS — E11.22 CKD STAGE 1 DUE TO TYPE 2 DIABETES MELLITUS (MULTI): ICD-10-CM

## 2023-08-15 DIAGNOSIS — E03.9 HYPOTHYROIDISM, UNSPECIFIED TYPE: ICD-10-CM

## 2023-08-15 DIAGNOSIS — E11.9 TYPE 2 DIABETES MELLITUS WITHOUT COMPLICATION, WITHOUT LONG-TERM CURRENT USE OF INSULIN (MULTI): ICD-10-CM

## 2023-08-15 DIAGNOSIS — R23.3 SKIN SPOTS, RED: ICD-10-CM

## 2023-08-15 DIAGNOSIS — R26.89 BALANCE PROBLEM: ICD-10-CM

## 2023-08-15 DIAGNOSIS — R42 DIZZINESS: ICD-10-CM

## 2023-08-15 LAB
ALANINE AMINOTRANSFERASE (SGPT) (U/L) IN SER/PLAS: 18 U/L (ref 10–52)
ALBUMIN (G/DL) IN SER/PLAS: 4.4 G/DL (ref 3.4–5)
ALKALINE PHOSPHATASE (U/L) IN SER/PLAS: 38 U/L (ref 33–136)
ANION GAP IN SER/PLAS: 15 MMOL/L (ref 10–20)
ASPARTATE AMINOTRANSFERASE (SGOT) (U/L) IN SER/PLAS: 20 U/L (ref 9–39)
BASOPHILS (10*3/UL) IN BLOOD BY AUTOMATED COUNT: 0.07 X10E9/L (ref 0–0.1)
BASOPHILS/100 LEUKOCYTES IN BLOOD BY AUTOMATED COUNT: 0.9 % (ref 0–2)
BILIRUBIN TOTAL (MG/DL) IN SER/PLAS: 0.5 MG/DL (ref 0–1.2)
CALCIDIOL (25 OH VITAMIN D3) (NG/ML) IN SER/PLAS: 28 NG/ML
CALCIUM (MG/DL) IN SER/PLAS: 9.7 MG/DL (ref 8.6–10.3)
CARBON DIOXIDE, TOTAL (MMOL/L) IN SER/PLAS: 24 MMOL/L (ref 21–32)
CHLORIDE (MMOL/L) IN SER/PLAS: 104 MMOL/L (ref 98–107)
CHOLESTEROL (MG/DL) IN SER/PLAS: 223 MG/DL (ref 0–199)
CHOLESTEROL IN HDL (MG/DL) IN SER/PLAS: 30.9 MG/DL
CHOLESTEROL/HDL RATIO: 7.2
CREATININE (MG/DL) IN SER/PLAS: 1.29 MG/DL (ref 0.5–1.3)
EOSINOPHILS (10*3/UL) IN BLOOD BY AUTOMATED COUNT: 0.31 X10E9/L (ref 0–0.4)
EOSINOPHILS/100 LEUKOCYTES IN BLOOD BY AUTOMATED COUNT: 3.8 % (ref 0–6)
ERYTHROCYTE DISTRIBUTION WIDTH (RATIO) BY AUTOMATED COUNT: 15.9 % (ref 11.5–14.5)
ERYTHROCYTE MEAN CORPUSCULAR HEMOGLOBIN CONCENTRATION (G/DL) BY AUTOMATED: 31.3 G/DL (ref 32–36)
ERYTHROCYTE MEAN CORPUSCULAR VOLUME (FL) BY AUTOMATED COUNT: 85 FL (ref 80–100)
ERYTHROCYTES (10*6/UL) IN BLOOD BY AUTOMATED COUNT: 4.43 X10E12/L (ref 4.5–5.9)
ESTIMATED AVERAGE GLUCOSE FOR HBA1C: 140 MG/DL
GFR MALE: 55 ML/MIN/1.73M2
GLUCOSE (MG/DL) IN SER/PLAS: 119 MG/DL (ref 74–99)
HEMATOCRIT (%) IN BLOOD BY AUTOMATED COUNT: 37.7 % (ref 41–52)
HEMOGLOBIN (G/DL) IN BLOOD: 11.8 G/DL (ref 13.5–17.5)
HEMOGLOBIN A1C/HEMOGLOBIN TOTAL IN BLOOD: 6.5 %
IMMATURE GRANULOCYTES/100 LEUKOCYTES IN BLOOD BY AUTOMATED COUNT: 0.6 % (ref 0–0.9)
LDL: 122 MG/DL (ref 0–99)
LEUKOCYTES (10*3/UL) IN BLOOD BY AUTOMATED COUNT: 8.1 X10E9/L (ref 4.4–11.3)
LYMPHOCYTES (10*3/UL) IN BLOOD BY AUTOMATED COUNT: 1.73 X10E9/L (ref 0.8–3)
LYMPHOCYTES/100 LEUKOCYTES IN BLOOD BY AUTOMATED COUNT: 21.4 % (ref 13–44)
MONOCYTES (10*3/UL) IN BLOOD BY AUTOMATED COUNT: 0.64 X10E9/L (ref 0.05–0.8)
MONOCYTES/100 LEUKOCYTES IN BLOOD BY AUTOMATED COUNT: 7.9 % (ref 2–10)
NEUTROPHILS (10*3/UL) IN BLOOD BY AUTOMATED COUNT: 5.27 X10E9/L (ref 1.6–5.5)
NEUTROPHILS/100 LEUKOCYTES IN BLOOD BY AUTOMATED COUNT: 65.4 % (ref 40–80)
NON HDL CHOLESTEROL: 192 MG/DL
PLATELETS (10*3/UL) IN BLOOD AUTOMATED COUNT: 211 X10E9/L (ref 150–450)
POTASSIUM (MMOL/L) IN SER/PLAS: 4.4 MMOL/L (ref 3.5–5.3)
PROSTATE SPECIFIC ANTIGEN,SCREEN: 7.5 NG/ML (ref 0–4)
PROTEIN TOTAL: 7 G/DL (ref 6.4–8.2)
SODIUM (MMOL/L) IN SER/PLAS: 139 MMOL/L (ref 136–145)
THYROTROPIN (MIU/L) IN SER/PLAS BY DETECTION LIMIT <= 0.05 MIU/L: 0.78 MIU/L (ref 0.44–3.98)
THYROXINE (T4) FREE (NG/DL) IN SER/PLAS: 1.22 NG/DL (ref 0.61–1.12)
TRIGLYCERIDE (MG/DL) IN SER/PLAS: 349 MG/DL (ref 0–149)
UREA NITROGEN (MG/DL) IN SER/PLAS: 32 MG/DL (ref 6–23)
VLDL: 70 MG/DL (ref 0–40)

## 2023-08-15 PROCEDURE — 36415 COLL VENOUS BLD VENIPUNCTURE: CPT

## 2023-08-15 PROCEDURE — 84439 ASSAY OF FREE THYROXINE: CPT

## 2023-08-15 PROCEDURE — 82306 VITAMIN D 25 HYDROXY: CPT

## 2023-08-15 PROCEDURE — 1170F FXNL STATUS ASSESSED: CPT | Performed by: FAMILY MEDICINE

## 2023-08-15 PROCEDURE — 84153 ASSAY OF PSA TOTAL: CPT

## 2023-08-15 PROCEDURE — 80053 COMPREHEN METABOLIC PANEL: CPT

## 2023-08-15 PROCEDURE — 3077F SYST BP >= 140 MM HG: CPT | Performed by: FAMILY MEDICINE

## 2023-08-15 PROCEDURE — 1159F MED LIST DOCD IN RCRD: CPT | Performed by: FAMILY MEDICINE

## 2023-08-15 PROCEDURE — 99213 OFFICE O/P EST LOW 20 MIN: CPT | Performed by: FAMILY MEDICINE

## 2023-08-15 PROCEDURE — 84443 ASSAY THYROID STIM HORMONE: CPT

## 2023-08-15 PROCEDURE — 3078F DIAST BP <80 MM HG: CPT | Performed by: FAMILY MEDICINE

## 2023-08-15 PROCEDURE — 1160F RVW MEDS BY RX/DR IN RCRD: CPT | Performed by: FAMILY MEDICINE

## 2023-08-15 PROCEDURE — 85025 COMPLETE CBC W/AUTO DIFF WBC: CPT

## 2023-08-15 PROCEDURE — 83036 HEMOGLOBIN GLYCOSYLATED A1C: CPT

## 2023-08-15 PROCEDURE — G0439 PPPS, SUBSEQ VISIT: HCPCS | Performed by: FAMILY MEDICINE

## 2023-08-15 PROCEDURE — 1036F TOBACCO NON-USER: CPT | Performed by: FAMILY MEDICINE

## 2023-08-15 PROCEDURE — 80061 LIPID PANEL: CPT

## 2023-08-15 RX ORDER — MELOXICAM 15 MG/1
15 TABLET ORAL
COMMUNITY
Start: 2020-11-02 | End: 2023-11-02 | Stop reason: WASHOUT

## 2023-08-15 RX ORDER — LEVOTHYROXINE SODIUM 88 UG/1
88 TABLET ORAL DAILY
Qty: 30 TABLET | Refills: 0 | Status: SHIPPED | OUTPATIENT
Start: 2023-08-15 | End: 2023-08-17 | Stop reason: SDUPTHER

## 2023-08-15 RX ORDER — TRAZODONE HYDROCHLORIDE 50 MG/1
50 TABLET ORAL NIGHTLY
Qty: 90 TABLET | Refills: 1 | Status: SHIPPED | OUTPATIENT
Start: 2023-08-15 | End: 2023-11-14 | Stop reason: SDUPTHER

## 2023-08-15 RX ORDER — MECOBALAMIN 5000 MCG
LOZENGE ORAL
COMMUNITY
Start: 2022-05-13 | End: 2023-11-22 | Stop reason: ALTCHOICE

## 2023-08-15 ASSESSMENT — ACTIVITIES OF DAILY LIVING (ADL)
DRESSING: INDEPENDENT
GROCERY_SHOPPING: INDEPENDENT
TAKING_MEDICATION: INDEPENDENT
MANAGING_FINANCES: INDEPENDENT
BATHING: INDEPENDENT
DOING_HOUSEWORK: INDEPENDENT

## 2023-08-15 ASSESSMENT — ENCOUNTER SYMPTOMS
OCCASIONAL FEELINGS OF UNSTEADINESS: 1
LOSS OF SENSATION IN FEET: 1
DEPRESSION: 0

## 2023-08-15 ASSESSMENT — PATIENT HEALTH QUESTIONNAIRE - PHQ9
1. LITTLE INTEREST OR PLEASURE IN DOING THINGS: NOT AT ALL
SUM OF ALL RESPONSES TO PHQ9 QUESTIONS 1 AND 2: 0
2. FEELING DOWN, DEPRESSED OR HOPELESS: NOT AT ALL

## 2023-08-15 NOTE — PATIENT INSTRUCTIONS

## 2023-08-17 DIAGNOSIS — E03.9 HYPOTHYROIDISM, UNSPECIFIED TYPE: ICD-10-CM

## 2023-08-17 RX ORDER — LEVOTHYROXINE SODIUM 88 UG/1
88 TABLET ORAL DAILY
Qty: 90 TABLET | Refills: 1 | Status: SHIPPED | OUTPATIENT
Start: 2023-08-17 | End: 2024-06-10

## 2023-08-27 DIAGNOSIS — E78.2 MIXED HYPERLIPIDEMIA: ICD-10-CM

## 2023-08-28 RX ORDER — FENOFIBRATE 145 MG/1
145 TABLET, FILM COATED ORAL EVERY OTHER DAY
Qty: 45 TABLET | Refills: 1 | Status: SHIPPED | OUTPATIENT
Start: 2023-08-28 | End: 2023-11-02 | Stop reason: WASHOUT

## 2023-10-03 ENCOUNTER — HOSPITAL ENCOUNTER (OUTPATIENT)
Dept: CARDIOLOGY | Facility: HOSPITAL | Age: 83
Discharge: HOME | End: 2023-10-03
Payer: MEDICARE

## 2023-10-03 DIAGNOSIS — R00.2 PALPITATIONS: ICD-10-CM

## 2023-10-03 PROCEDURE — 93225 XTRNL ECG REC<48 HRS REC: CPT

## 2023-10-03 PROCEDURE — 93227 XTRNL ECG REC<48 HR R&I: CPT | Performed by: INTERNAL MEDICINE

## 2023-10-05 ENCOUNTER — HOSPITAL ENCOUNTER (OUTPATIENT)
Dept: RADIOLOGY | Facility: CLINIC | Age: 83
Discharge: HOME | End: 2023-10-05
Payer: MEDICARE

## 2023-10-05 DIAGNOSIS — H90.3 SENSORINEURAL HEARING LOSS, BILATERAL: ICD-10-CM

## 2023-10-05 DIAGNOSIS — R42 DIZZINESS AND GIDDINESS: ICD-10-CM

## 2023-10-05 PROBLEM — E11.9 TYPE 2 DIABETES MELLITUS WITHOUT COMPLICATION (MULTI): Status: ACTIVE | Noted: 2017-07-15

## 2023-10-05 PROBLEM — J02.9 SORE THROAT: Status: ACTIVE | Noted: 2023-10-05

## 2023-10-05 PROBLEM — E78.49 OTHER HYPERLIPIDEMIA: Status: ACTIVE | Noted: 2023-10-05

## 2023-10-05 PROBLEM — M25.569 KNEE PAIN: Status: ACTIVE | Noted: 2023-10-05

## 2023-10-05 PROBLEM — F41.9 ANXIETY DISORDER, UNSPECIFIED: Status: ACTIVE | Noted: 2022-06-27

## 2023-10-05 PROBLEM — R09.89 RUNNY NOSE: Status: ACTIVE | Noted: 2023-10-05

## 2023-10-05 PROBLEM — E78.5 HYPERLIPIDEMIA, UNSPECIFIED: Status: ACTIVE | Noted: 2022-06-27

## 2023-10-05 PROBLEM — E11.22 TYPE 2 DIABETES MELLITUS WITH DIABETIC CHRONIC KIDNEY DISEASE (MULTI): Status: ACTIVE | Noted: 2022-06-27

## 2023-10-05 PROBLEM — M25.512 SHOULDER PAIN, LEFT: Status: ACTIVE | Noted: 2023-10-05

## 2023-10-05 PROBLEM — R26.89 OTHER ABNORMALITIES OF GAIT AND MOBILITY: Status: ACTIVE | Noted: 2023-08-31

## 2023-10-05 PROBLEM — R51.9 HEADACHE: Status: ACTIVE | Noted: 2023-10-05

## 2023-10-05 PROBLEM — L98.9 SKIN LESIONS: Status: ACTIVE | Noted: 2023-10-05

## 2023-10-05 PROBLEM — N28.9 KIDNEY DISEASE: Status: ACTIVE | Noted: 2022-06-27

## 2023-10-05 PROBLEM — R60.0 BILATERAL LEG EDEMA: Status: ACTIVE | Noted: 2023-10-05

## 2023-10-05 PROBLEM — Z79.84 LONG TERM (CURRENT) USE OF ORAL HYPOGLYCEMIC DRUGS: Status: ACTIVE | Noted: 2022-06-27

## 2023-10-05 PROBLEM — M17.12 PRIMARY OSTEOARTHRITIS OF LEFT KNEE: Status: ACTIVE | Noted: 2023-10-05

## 2023-10-05 PROBLEM — G47.00 INSOMNIA: Status: ACTIVE | Noted: 2023-10-05

## 2023-10-05 PROBLEM — S82.90XA FRACTURE OF LEG: Status: ACTIVE | Noted: 2022-06-27

## 2023-10-05 PROBLEM — M54.50 LOWER BACK PAIN: Status: ACTIVE | Noted: 2023-10-05

## 2023-10-05 PROBLEM — R41.3 OTHER AMNESIA: Status: ACTIVE | Noted: 2023-08-31

## 2023-10-05 PROBLEM — M79.673 FOOT PAIN: Status: ACTIVE | Noted: 2023-10-05

## 2023-10-05 PROBLEM — R25.1 TREMOR: Status: ACTIVE | Noted: 2023-10-05

## 2023-10-05 PROBLEM — J01.80 OTHER ACUTE SINUSITIS: Status: ACTIVE | Noted: 2023-10-05

## 2023-10-05 PROBLEM — H93.12 SUBJECTIVE TINNITUS, LEFT: Status: ACTIVE | Noted: 2023-09-20

## 2023-10-05 PROBLEM — I12.9 HYPERTENSIVE CHRONIC KIDNEY DISEASE W STG 1-4/UNSP CHR KDNY: Status: ACTIVE | Noted: 2022-06-27

## 2023-10-05 PROBLEM — E53.8 VITAMIN B12 DEFICIENCY: Status: ACTIVE | Noted: 2023-10-05

## 2023-10-05 PROBLEM — I87.2 VENOUS INSUFFICIENCY: Status: ACTIVE | Noted: 2023-10-05

## 2023-10-05 PROBLEM — E03.8 OTHER SPECIFIED HYPOTHYROIDISM: Status: ACTIVE | Noted: 2023-10-05

## 2023-10-05 PROBLEM — Z96.652 PRESENCE OF LEFT ARTIFICIAL KNEE JOINT: Status: ACTIVE | Noted: 2022-06-27

## 2023-10-05 PROBLEM — R06.7 SNEEZING: Status: ACTIVE | Noted: 2023-10-05

## 2023-10-05 PROBLEM — H81.90 VESTIBULAR DIZZINESS: Status: ACTIVE | Noted: 2023-10-05

## 2023-10-05 PROCEDURE — 70553 MRI BRAIN STEM W/O & W/DYE: CPT

## 2023-10-05 PROCEDURE — 2550000001 HC RX 255 CONTRASTS: Performed by: RADIOLOGY

## 2023-10-05 PROCEDURE — 70553 MRI BRAIN STEM W/O & W/DYE: CPT | Performed by: RADIOLOGY

## 2023-10-05 PROCEDURE — A9575 INJ GADOTERATE MEGLUMI 0.1ML: HCPCS | Performed by: RADIOLOGY

## 2023-10-05 RX ORDER — GABAPENTIN 100 MG/1
1 CAPSULE ORAL 3 TIMES DAILY
COMMUNITY
Start: 2022-02-21 | End: 2023-11-02 | Stop reason: WASHOUT

## 2023-10-05 RX ORDER — LOSARTAN POTASSIUM 50 MG/1
TABLET ORAL
COMMUNITY
Start: 2022-06-01 | End: 2023-11-22 | Stop reason: ALTCHOICE

## 2023-10-05 RX ORDER — OXYCODONE HYDROCHLORIDE 5 MG/1
TABLET ORAL
COMMUNITY
Start: 2022-06-27 | End: 2023-11-02 | Stop reason: WASHOUT

## 2023-10-05 RX ORDER — GADOTERATE MEGLUMINE 376.9 MG/ML
0.2 INJECTION INTRAVENOUS
Status: COMPLETED | OUTPATIENT
Start: 2023-10-05 | End: 2023-10-05

## 2023-10-05 RX ORDER — CEFUROXIME AXETIL 250 MG/1
1 TABLET ORAL EVERY 12 HOURS
COMMUNITY
Start: 2023-01-03 | End: 2023-11-02 | Stop reason: WASHOUT

## 2023-10-05 RX ORDER — DOCUSATE SODIUM 100 MG/1
CAPSULE, LIQUID FILLED ORAL
COMMUNITY
Start: 2022-06-27

## 2023-10-05 RX ORDER — TRAMADOL HYDROCHLORIDE 50 MG/1
1 TABLET ORAL EVERY 8 HOURS PRN
COMMUNITY
Start: 2022-11-08 | End: 2023-11-02 | Stop reason: WASHOUT

## 2023-10-05 RX ORDER — TAMSULOSIN HYDROCHLORIDE 0.4 MG/1
CAPSULE ORAL
COMMUNITY
Start: 2022-06-01 | End: 2023-11-02 | Stop reason: WASHOUT

## 2023-10-05 RX ORDER — VALSARTAN 80 MG/1
TABLET ORAL
COMMUNITY
Start: 2022-06-03 | End: 2023-11-22 | Stop reason: ALTCHOICE

## 2023-10-05 RX ADMIN — GADOTERATE MEGLUMINE 18.5 ML: 376.9 INJECTION INTRAVENOUS at 10:51

## 2023-11-02 ENCOUNTER — OFFICE VISIT (OUTPATIENT)
Dept: NEUROLOGY | Facility: CLINIC | Age: 83
End: 2023-11-02
Payer: MEDICARE

## 2023-11-02 VITALS
HEIGHT: 75 IN | DIASTOLIC BLOOD PRESSURE: 81 MMHG | HEART RATE: 111 BPM | WEIGHT: 212.2 LBS | SYSTOLIC BLOOD PRESSURE: 139 MMHG | BODY MASS INDEX: 26.38 KG/M2

## 2023-11-02 DIAGNOSIS — G25.0 ESSENTIAL TREMOR: ICD-10-CM

## 2023-11-02 DIAGNOSIS — F02.80 MIXED DEMENTIA (MULTI): Primary | ICD-10-CM

## 2023-11-02 DIAGNOSIS — G20.C PARKINSONISM, UNSPECIFIED PARKINSONISM TYPE (MULTI): ICD-10-CM

## 2023-11-02 DIAGNOSIS — F01.50 MIXED DEMENTIA (MULTI): Primary | ICD-10-CM

## 2023-11-02 DIAGNOSIS — G30.9 MIXED DEMENTIA (MULTI): Primary | ICD-10-CM

## 2023-11-02 DIAGNOSIS — G62.9 NEUROPATHY: ICD-10-CM

## 2023-11-02 PROCEDURE — 3079F DIAST BP 80-89 MM HG: CPT | Performed by: STUDENT IN AN ORGANIZED HEALTH CARE EDUCATION/TRAINING PROGRAM

## 2023-11-02 PROCEDURE — 1159F MED LIST DOCD IN RCRD: CPT | Performed by: STUDENT IN AN ORGANIZED HEALTH CARE EDUCATION/TRAINING PROGRAM

## 2023-11-02 PROCEDURE — 99215 OFFICE O/P EST HI 40 MIN: CPT | Performed by: STUDENT IN AN ORGANIZED HEALTH CARE EDUCATION/TRAINING PROGRAM

## 2023-11-02 PROCEDURE — 1160F RVW MEDS BY RX/DR IN RCRD: CPT | Performed by: STUDENT IN AN ORGANIZED HEALTH CARE EDUCATION/TRAINING PROGRAM

## 2023-11-02 PROCEDURE — 3075F SYST BP GE 130 - 139MM HG: CPT | Performed by: STUDENT IN AN ORGANIZED HEALTH CARE EDUCATION/TRAINING PROGRAM

## 2023-11-02 PROCEDURE — 1036F TOBACCO NON-USER: CPT | Performed by: STUDENT IN AN ORGANIZED HEALTH CARE EDUCATION/TRAINING PROGRAM

## 2023-11-02 RX ORDER — CYANOCOBALAMIN 1000 UG/ML
1 INJECTION, SOLUTION INTRAMUSCULAR; SUBCUTANEOUS
COMMUNITY
Start: 2022-01-05 | End: 2023-11-22 | Stop reason: ALTCHOICE

## 2023-11-02 RX ORDER — LORAZEPAM 0.5 MG/1
TABLET ORAL
COMMUNITY
Start: 2019-12-23 | End: 2023-11-02 | Stop reason: WASHOUT

## 2023-11-02 RX ORDER — CARBIDOPA AND LEVODOPA 25; 100 MG/1; MG/1
TABLET ORAL
COMMUNITY
Start: 2021-06-07 | End: 2023-11-02 | Stop reason: WASHOUT

## 2023-11-02 RX ORDER — CEPHALEXIN 500 MG/1
CAPSULE ORAL
COMMUNITY
Start: 2019-10-18 | End: 2023-11-02 | Stop reason: WASHOUT

## 2023-11-02 RX ORDER — NAPROXEN SODIUM 220 MG/1
1 TABLET ORAL DAILY
COMMUNITY

## 2023-11-02 RX ORDER — ASCORBIC ACID 500 MG
1000 TABLET ORAL DAILY
COMMUNITY

## 2023-11-02 RX ORDER — DONEPEZIL HYDROCHLORIDE 5 MG/1
TABLET, FILM COATED ORAL
Qty: 30 TABLET | Refills: 5 | Status: SHIPPED | OUTPATIENT
Start: 2023-11-02 | End: 2024-03-07 | Stop reason: SDUPTHER

## 2023-11-02 RX ORDER — MECLIZINE HYDROCHLORIDE 25 MG/1
TABLET ORAL
COMMUNITY
Start: 2020-09-21 | End: 2023-11-02 | Stop reason: WASHOUT

## 2023-11-02 RX ORDER — METHYLPREDNISOLONE 4 MG/1
TABLET ORAL
COMMUNITY
Start: 2022-01-08 | End: 2023-11-02 | Stop reason: WASHOUT

## 2023-11-02 RX ORDER — NIACIN 1000 MG/1
1000 TABLET, EXTENDED RELEASE ORAL NIGHTLY
COMMUNITY
End: 2023-11-22 | Stop reason: DRUGHIGH

## 2023-11-02 ASSESSMENT — PATIENT HEALTH QUESTIONNAIRE - PHQ9
1. LITTLE INTEREST OR PLEASURE IN DOING THINGS: NOT AT ALL
2. FEELING DOWN, DEPRESSED OR HOPELESS: NOT AT ALL
SUM OF ALL RESPONSES TO PHQ9 QUESTIONS 1 & 2: 0

## 2023-11-02 ASSESSMENT — MONTREAL COGNITIVE ASSESSMENT (MOCA)
4. NAME EACH OF THE THREE ANIMALS SHOWN: 3
5. MEMORY TRIALS: 0
VISUOSPATIAL/EXECUTIVE SUBSCORE: 0
11. FOR EACH PAIR OF WORDS, WHAT CATEGORY DO THEY BELONG TO (OUT OF 2): 0
6. READ LIST OF DIGITS [FORWARD/BACKWARD]: 2
10. [FLUENCY] NAME WORDS STARTING WITH DESIGNATED LETTER: 0
8. SERIAL SUBTRACTION OF 7S: 1
7. [VIGILENCE] TAP WHEN HEARING DESIGNATED LETTER: 0
12. MEMORY INDEX SCORE: 0
9. REPEAT EACH SENTENCE: 1
WHAT LEVEL OF EDUCATION WAS ATTAINED: 1
13. ORIENTATION SUBSCORE: 2
WHAT IS THE TOTAL SCORE (OUT OF 30): 10

## 2023-11-02 ASSESSMENT — VISUAL ACUITY: VA_NORMAL: 1

## 2023-11-02 NOTE — PROGRESS NOTES
Subjective     Dakota Manzano is a left handed  83 y.o. year old male, 12 years formal education, formerly worked for [employer], retired 20 years ago, who presents with New Patient Visit and Dizziness (NPV Dizziness former Dr. Hines pt. Memory loss and new MRI results). Patient is accompanied by: child daughter  he lives alone but next door to daughter.  Visit type: new patient visit     He has had memory decline for around 5 years, slowly progressive. For instance he forgets addresses, has gotten lost on a few occassions, has had to stop driving. He repeats himself with questions every day. He continues to manage his own finances, cooking, shopping. Dtr helps with managing his medicines in pill box as well as any paperwork. No VH. No cognitive fluctuation. No personality changes, food fads. He sleeps on/off because of frequent awakening. Has had tremor for past 5-10 years. No RBD symptoms. He has a hx of B12 deficiency 154 in 2021.    Prior treatment:  Sinemet has not helped    He has known orthostatic hypotension but has not had syncope for the past year. He has DM2 for 30 years and numbness in soles of feet. He has a lot of neck pain and spasm. He has bladder frequency and OAB symptoms. He has diffuse weakness.    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)    Difficulty sleeping    Dizziness    Elevated PSA    Erectile dysfunction    Essential hypertension    Essential tremor    Hypothyroidism due to acquired atrophy of thyroid    Gait disturbance    Fatty liver    Mixed hyperlipidemia    Muscle spasms of neck    Neuritis of left foot    Neuritis of right foot    Osteoporosis    Primary gout    Primary insomnia    Primary osteoarthritis involving multiple joints    Primary orthostatic hypotension    Renal insufficiency    Skin cancer, basal cell     Status post total left knee replacement    Type 2 diabetes mellitus without complication, without long-term current use of insulin (CMS/[facility])    Anxiety disorder, unspecified    Hyperlipidemia, unspecified    Kidney disease    Hypertensive chronic kidney disease w stg 1-4/unsp chr kdny    Long term (current) use of oral hypoglycemic drugs    Type 2 diabetes mellitus with diabetic chronic kidney disease (CMS/[facility])    Fracture of leg    Asymmetrical sensorineural hearing loss    Bilateral leg edema    Foot pain    Knee pain    Other abnormalities of gait and mobility    Other acute sinusitis    Other amnesia    Other specified hypothyroidism    Presence of left artificial knee joint    Primary osteoarthritis of left knee    Shoulder pain, left    Skin lesions    Runny nose    Sneezing    Sore throat    Subjective tinnitus, left    Tremor    Venous insufficiency    Vestibular dizziness    Vitamin B12 deficiency    Headache    Lower back pain    Dizziness and giddiness    Lightheadedness    Benign hypertension    Hyperlipidemia    Other hyperlipidemia    Insomnia    Type 2 diabetes mellitus without complication (CMS/[facility])      Past Medical History:   Diagnosis Date    Anesthesia of skin 01/11/2022    Arm numbness left    Body mass index (BMI) 27.0-27.9, adult 06/07/2021    BMI 27.0-27.9,adult    Disorder of the skin and subcutaneous tissue, unspecified 09/21/2020    Face lesion    Encounter for general adult medical examination without abnormal findings 10/27/2021    Encounter for Medicare annual wellness exam    Encounter for general adult medical examination without abnormal findings 09/21/2020    Encounter for Medicare annual wellness exam    Encounter for immunization 10/27/2021    Encounter for immunization    Encounter for removal of sutures 04/05/2021    Visit for suture removal    Encounter for screening for malignant neoplasm of prostate     Screening PSA (prostate specific antigen)    Encounter for screening for  malignant neoplasm of prostate     Encounter for prostate cancer screening    Ingrowing nail 10/18/2019    Ingrown toenail    Laceration without foreign body of other part of head, initial encounter 04/05/2021    Forehead laceration, initial encounter    Other conditions influencing health status     No significant past medical history    Personal history of other diseases of the nervous system and sense organs     History of eye problem    Personal history of other drug therapy 09/21/2020    History of influenza vaccination    Syncope and collapse 01/11/2022    Blackout    Tinea unguium 10/18/2019    Toenail fungus    Type 2 diabetes mellitus without complications (CMS/[facility]) 04/08/2019    Type 2 diabetes mellitus without complication, unspecified whether long term insulin use      Past Surgical History:   Procedure Laterality Date    OTHER SURGICAL HISTORY  06/11/2019    Appendectomy    OTHER SURGICAL HISTORY  06/11/2019    Hernia repair    OTHER SURGICAL HISTORY  06/11/2019    Leg surgery      Social History     Socioeconomic History    Marital status:      Spouse name: Not on file    Number of children: Not on file    Years of education: Not on file    Highest education level: Not on file   Occupational History    Not on file   Tobacco Use    Smoking status: Never    Smokeless tobacco: Never   Vaping Use    Vaping Use: Never used   Substance and Sexual Activity    Alcohol use: Defer    Drug use: Defer    Sexual activity: Defer   Other Topics Concern    Not on file   Social History Narrative    Not on file     Social Determinants of Health     Financial Resource Strain: Not on file   Food Insecurity: Not on file   Transportation Needs: Not on file   Physical Activity: Not on file   Stress: Not on file   Social Connections: Not on file   Intimate Partner Violence: Not on file   Housing Stability: Not on file      Family History   Problem Relation Name Age of Onset    Retinal degeneration Mother      Other  () Mother      Dementia Mother      Parkinsonism Mother      Other () Father      Tremor Father      Other () Brother      Heart attack Brother      Tremor Brother      Tremor Paternal Grandfather      Heart disease Other Maternal Relatives     Intellectual Disability Grandchild        Patient Health Questionnaire-2 Score: 0          Review of Systems  All other system have been reviewed and are negative for complaint.  Objective   Neurological Exam    Cranial Nerves  CN II: Visual acuity is normal. Visual fields full to confrontation.  CN III, IV, VI: Extraocular movements intact bilaterally. Normal lids and orbits bilaterally. Pupils equal round and reactive to light bilaterally.  CN V: Facial sensation is normal.  CN VII: Full and symmetric facial movement.  CN VIII: Hearing is normal.  CN IX, X: Palate elevates symmetrically. Normal gag reflex.  CN XI: Shoulder shrug strength is normal.  CN XII: Tongue midline without atrophy or fasciculations.    Motor  Normal muscle bulk throughout. Strength is 5/5 throughout all four extremities.  Mild symmetric bradykinesia  Paratonic suspect normal tone  Fast kinetic > postural tremor of hands.    Sensory  Reduced pinprick at distal toes, preserved proximally. Complete pallanesthesia of lower extremities, reduced at fingers.     Reflexes                                            Right                      Left  Biceps                                 2+                         2+  Triceps                                1+                         1+  Patellar                                1+                         1+  Achilles                                0                         0  Right Plantar: downgoing  Left Plantar: downgoing    Coordination  Right: Finger-to-nose normal.Left: Finger-to-nose normal.    Gait    Reduced R>L armswing, wide based, good stride, unable to tandem  Positive romberg.                Abstraction: 0, Attention: Read  List of Digits: 2, Attention: Read List of Letters: 0, Attention: Serial Sevens: 1, Delayed Recall: 0 (MIS3), Language: Fluency: 0, Memory (Score '0' as this is an Unscored Section): 0, Language: Repeat: 1, Naming: 3, Orientation: 2, Visuospatial/Executive: 0, MOCA Total Score: 10         Hemoglobin A1C   Date Value Ref Range Status   08/15/2023 6.5 (A) % Final     Comment:          Diagnosis of Diabetes-Adults   Non-Diabetic: < or = 5.6%   Increased risk for developing diabetes: 5.7-6.4%   Diagnostic of diabetes: > or = 6.5%  .       Monitoring of Diabetes                Age (y)     Therapeutic Goal (%)   Adults:          >18           <7.0   Pediatrics:    13-18           <7.5                   7-12           <8.0                   0- 6            7.5-8.5   American Diabetes Association. Diabetes Care 33(S1), Jan 2010.     Estimated Average Glucose   Date Value Ref Range Status   08/15/2023 140 MG/DL Final     TSH   Date Value Ref Range Status   08/15/2023 0.78 0.44 - 3.98 mIU/L Final     Comment:      TSH testing is performed using different testing    methodology at [facility] than at other    [facility] hospitals. Direct result comparisons should    only be made within the same method.     Folate   Date Value Ref Range Status   06/07/2021 8.9 >5.0 ng/mL Final     Comment:     Low           <3.4  Borderline 3.4-5.0  Normal        >5.0  .   Patients receiving more than 5 mg/day of biotin may have interference   in test results. A sample should be taken no sooner than eight hours   after previous dose. Contact the testing laboratory for additional   information.      No MRI head results found for the past 12 months       Assessment/Plan   Diagnoses and all orders for this visit:  Mixed dementia (CMS/HCC)  -     Folate; Future  -     Vitamin B12; Future  -     Methylmalonic Acid; Future  -     TSH with reflex to Free T4 if abnormal; Future  -     Fragile X Analysis; Future  -     donepezil (Aricept) 5 mg  tablet; 1/2 tab daily for a week, then 1 tab daily  Essential tremor  -     Fragile X Analysis; Future  Neuropathy  -     Serum Protein Electrophoresis + Immunofixation; Future  -     Fragile X Analysis; Future  Parkinsonism, unspecified Parkinsonism type  -     Fragile X Analysis; Future    Dakota Manzano is a 83 y.o. year old male with longstanding DM2, neuropathy, orthostatic hypotension, hx of treated B12 deficiency, ET, here for progressive cognitive decline for the past 5 years. He seems to have prominent and early visuospatial and executive deficits, but also amnestic deficits. His MOCA is 10/30 today. His MRI showed moderate burden of small vessel ischemic changes as well as some hippocampal atrophy. I suspect he has a component of vascular cognitive impairment. His exam additionally does show prominent action tremor and mild bradykinesia. He has several family members with tremor, intellectual disability, dementia, and parkinsonism. He may have Alzheimer vs DLB, with FXTAS also being a consideration.     We discussed the following plan today:  Bloodwork  We will start medication for donepezil with the goal of modest benefit in cognition. Possible side effects include nausea, upset stomach, low heart rate, nightmares, and diarrhea  Return in 4 months  Will offer neuropsychology at next visit

## 2023-11-02 NOTE — PATIENT INSTRUCTIONS
Thank you for your visit today. You were seen by Dr. Oneill for dementia (likely mixed Alzheimer and vascular though Dementia with Lewy Bodies is possible) as well as likely essential tremor. If you have any questions or need to reach me, call my office at [phone].    We discussed the following plan today:  Bloodwork  We will start medication for donepezil with the goal of modest benefit in cognition. Possible side effects include nausea, upset stomach, low heart rate, nightmares, and diarrhea  Return in 4 months    General brain health guidelines:  - make sure your other medical conditions are well controlled (e.g., high blood pressure, high cholesterol, diabetes, sleep apnea etc)  - do not smoke or chew tobacco  - address any sensory deficits (e.g., proper glasses for poor eyesight, hearing aids for hearing loss)  - use a weekly pill box  - eat a heart healthy diet (e.g., lots of fruits and vegetables, low fat and cholesterol)  - exercise at least four days per week, 30 minutes at a time at an intensity that would make it difficult to converse with someone   - make sure you are getting at least 7 hours of quality sleep per night  - keep yourself mentally active daily by reading, playing cards, doing word searches, puzzles, etc.  - stay socially active by being part of a group or organization

## 2023-11-06 DIAGNOSIS — M54.50 CHRONIC MIDLINE LOW BACK PAIN WITHOUT SCIATICA: ICD-10-CM

## 2023-11-06 DIAGNOSIS — N40.0 BENIGN PROSTATIC HYPERPLASIA WITHOUT LOWER URINARY TRACT SYMPTOMS: ICD-10-CM

## 2023-11-06 DIAGNOSIS — G89.29 CHRONIC MIDLINE LOW BACK PAIN WITHOUT SCIATICA: ICD-10-CM

## 2023-11-06 RX ORDER — MELOXICAM 15 MG/1
15 TABLET ORAL
Qty: 90 TABLET | Refills: 0 | Status: SHIPPED
Start: 2023-11-06 | End: 2023-11-22 | Stop reason: DRUGHIGH

## 2023-11-06 RX ORDER — TAMSULOSIN HYDROCHLORIDE 0.4 MG/1
CAPSULE ORAL
Qty: 90 CAPSULE | Refills: 0 | Status: SHIPPED | OUTPATIENT
Start: 2023-11-06

## 2023-11-06 NOTE — TELEPHONE ENCOUNTER
Rx Refill Request Telephone Encounter    Name:  Dakota Manzano  : 1940     Medication Name:  Tamsulosin .4 mg  Quantity (Optional):    30  Directions (Optional):   Take 1 capsule (0.4 mg) by mouth once daily.     Medication Name:  Meloxicam 15 mg  Directions (Optional):   Take 1 tablet (15 mg) by mouth once daily.     Medication Name:  Adipex- pt's daughter asking if he still needs to be on this?      Specific Pharmacy location:  [facility] [place]    Date of last appointment:  8/15/23  Date of next appointment:  23

## 2023-11-14 ENCOUNTER — LAB (OUTPATIENT)
Dept: LAB | Facility: LAB | Age: 83
End: 2023-11-14
Payer: MEDICARE

## 2023-11-14 ENCOUNTER — TELEPHONE (OUTPATIENT)
Dept: PRIMARY CARE | Facility: CLINIC | Age: 83
End: 2023-11-14

## 2023-11-14 ENCOUNTER — OFFICE VISIT (OUTPATIENT)
Dept: PRIMARY CARE | Facility: CLINIC | Age: 83
End: 2023-11-14
Payer: MEDICARE

## 2023-11-14 VITALS
TEMPERATURE: 97.8 F | HEART RATE: 75 BPM | HEIGHT: 75 IN | SYSTOLIC BLOOD PRESSURE: 120 MMHG | WEIGHT: 209.8 LBS | OXYGEN SATURATION: 98 % | RESPIRATION RATE: 18 BRPM | BODY MASS INDEX: 26.08 KG/M2 | DIASTOLIC BLOOD PRESSURE: 70 MMHG

## 2023-11-14 DIAGNOSIS — E11.9 TYPE 2 DIABETES MELLITUS WITHOUT COMPLICATION, WITHOUT LONG-TERM CURRENT USE OF INSULIN (MULTI): ICD-10-CM

## 2023-11-14 DIAGNOSIS — N18.1 CKD STAGE 1 DUE TO TYPE 2 DIABETES MELLITUS (MULTI): ICD-10-CM

## 2023-11-14 DIAGNOSIS — G20.C PARKINSONISM, UNSPECIFIED PARKINSONISM TYPE (MULTI): ICD-10-CM

## 2023-11-14 DIAGNOSIS — F02.80 MIXED DEMENTIA (MULTI): ICD-10-CM

## 2023-11-14 DIAGNOSIS — I10 ESSENTIAL HYPERTENSION: ICD-10-CM

## 2023-11-14 DIAGNOSIS — E78.49 OTHER HYPERLIPIDEMIA: ICD-10-CM

## 2023-11-14 DIAGNOSIS — E53.8 B12 DEFICIENCY: Primary | ICD-10-CM

## 2023-11-14 DIAGNOSIS — G30.9 MIXED DEMENTIA (MULTI): ICD-10-CM

## 2023-11-14 DIAGNOSIS — E55.9 VITAMIN D DEFICIENCY: ICD-10-CM

## 2023-11-14 DIAGNOSIS — R97.20 ELEVATED PSA: ICD-10-CM

## 2023-11-14 DIAGNOSIS — Z23 NEED FOR INFLUENZA VACCINATION: ICD-10-CM

## 2023-11-14 DIAGNOSIS — G25.0 ESSENTIAL TREMOR: ICD-10-CM

## 2023-11-14 DIAGNOSIS — F51.01 PRIMARY INSOMNIA: ICD-10-CM

## 2023-11-14 DIAGNOSIS — N18.31 STAGE 3A CHRONIC KIDNEY DISEASE (MULTI): ICD-10-CM

## 2023-11-14 DIAGNOSIS — F01.50 MIXED DEMENTIA (MULTI): ICD-10-CM

## 2023-11-14 DIAGNOSIS — E11.22 CKD STAGE 1 DUE TO TYPE 2 DIABETES MELLITUS (MULTI): ICD-10-CM

## 2023-11-14 DIAGNOSIS — E53.8 B12 DEFICIENCY: ICD-10-CM

## 2023-11-14 DIAGNOSIS — G62.9 NEUROPATHY: ICD-10-CM

## 2023-11-14 DIAGNOSIS — N40.0 BENIGN PROSTATIC HYPERPLASIA WITHOUT LOWER URINARY TRACT SYMPTOMS: ICD-10-CM

## 2023-11-14 DIAGNOSIS — E11.9 TYPE 2 DIABETES MELLITUS WITHOUT COMPLICATION, WITHOUT LONG-TERM CURRENT USE OF INSULIN (MULTI): Primary | ICD-10-CM

## 2023-11-14 LAB
25(OH)D3 SERPL-MCNC: 33 NG/ML (ref 30–100)
ANION GAP SERPL CALC-SCNC: 14 MMOL/L (ref 10–20)
BUN SERPL-MCNC: 26 MG/DL (ref 6–23)
CALCIUM SERPL-MCNC: 9.8 MG/DL (ref 8.6–10.3)
CHLORIDE SERPL-SCNC: 105 MMOL/L (ref 98–107)
CO2 SERPL-SCNC: 26 MMOL/L (ref 21–32)
CREAT SERPL-MCNC: 1.53 MG/DL (ref 0.5–1.3)
EST. AVERAGE GLUCOSE BLD GHB EST-MCNC: 137 MG/DL
FOLATE SERPL-MCNC: 13.7 NG/ML
GFR SERPL CREATININE-BSD FRML MDRD: 45 ML/MIN/1.73M*2
GLUCOSE SERPL-MCNC: 130 MG/DL (ref 74–99)
HBA1C MFR BLD: 6.4 %
POTASSIUM SERPL-SCNC: 4.7 MMOL/L (ref 3.5–5.3)
PROT SERPL-MCNC: 7.1 G/DL (ref 6.4–8.2)
SODIUM SERPL-SCNC: 140 MMOL/L (ref 136–145)
T4 FREE SERPL-MCNC: 0.96 NG/DL (ref 0.61–1.12)
TSH SERPL-ACNC: 5.29 MIU/L (ref 0.44–3.98)
VIT B12 SERPL-MCNC: 1701 PG/ML (ref 211–911)

## 2023-11-14 PROCEDURE — 83036 HEMOGLOBIN GLYCOSYLATED A1C: CPT

## 2023-11-14 PROCEDURE — 84443 ASSAY THYROID STIM HORMONE: CPT

## 2023-11-14 PROCEDURE — 3078F DIAST BP <80 MM HG: CPT | Performed by: FAMILY MEDICINE

## 2023-11-14 PROCEDURE — 82306 VITAMIN D 25 HYDROXY: CPT

## 2023-11-14 PROCEDURE — 1036F TOBACCO NON-USER: CPT | Performed by: FAMILY MEDICINE

## 2023-11-14 PROCEDURE — 86334 IMMUNOFIX E-PHORESIS SERUM: CPT

## 2023-11-14 PROCEDURE — 84165 PROTEIN E-PHORESIS SERUM: CPT | Performed by: STUDENT IN AN ORGANIZED HEALTH CARE EDUCATION/TRAINING PROGRAM

## 2023-11-14 PROCEDURE — 84155 ASSAY OF PROTEIN SERUM: CPT

## 2023-11-14 PROCEDURE — 86320 SERUM IMMUNOELECTROPHORESIS: CPT | Performed by: STUDENT IN AN ORGANIZED HEALTH CARE EDUCATION/TRAINING PROGRAM

## 2023-11-14 PROCEDURE — 82607 VITAMIN B-12: CPT

## 2023-11-14 PROCEDURE — 36415 COLL VENOUS BLD VENIPUNCTURE: CPT

## 2023-11-14 PROCEDURE — 99213 OFFICE O/P EST LOW 20 MIN: CPT | Performed by: FAMILY MEDICINE

## 2023-11-14 PROCEDURE — 90662 IIV NO PRSV INCREASED AG IM: CPT | Performed by: FAMILY MEDICINE

## 2023-11-14 PROCEDURE — 1160F RVW MEDS BY RX/DR IN RCRD: CPT | Performed by: FAMILY MEDICINE

## 2023-11-14 PROCEDURE — 80048 BASIC METABOLIC PNL TOTAL CA: CPT

## 2023-11-14 PROCEDURE — 1159F MED LIST DOCD IN RCRD: CPT | Performed by: FAMILY MEDICINE

## 2023-11-14 PROCEDURE — 84439 ASSAY OF FREE THYROXINE: CPT

## 2023-11-14 PROCEDURE — 3074F SYST BP LT 130 MM HG: CPT | Performed by: FAMILY MEDICINE

## 2023-11-14 PROCEDURE — 82746 ASSAY OF FOLIC ACID SERUM: CPT

## 2023-11-14 PROCEDURE — 83921 ORGANIC ACID SINGLE QUANT: CPT

## 2023-11-14 PROCEDURE — 81243 FMR1 GEN ALY DETC ABNL ALLEL: CPT

## 2023-11-14 PROCEDURE — 84165 PROTEIN E-PHORESIS SERUM: CPT

## 2023-11-14 PROCEDURE — G0008 ADMIN INFLUENZA VIRUS VAC: HCPCS | Performed by: FAMILY MEDICINE

## 2023-11-14 RX ORDER — CYANOCOBALAMIN 1000 UG/ML
1000 INJECTION, SOLUTION INTRAMUSCULAR; SUBCUTANEOUS ONCE
Status: CANCELLED | OUTPATIENT
Start: 2023-11-14 | End: 2023-11-14

## 2023-11-14 RX ORDER — PIOGLITAZONEHYDROCHLORIDE 15 MG/1
15 TABLET ORAL DAILY
Qty: 90 TABLET | Refills: 1 | Status: SHIPPED
Start: 2023-11-14 | End: 2023-11-22

## 2023-11-14 RX ORDER — PIOGLITAZONEHYDROCHLORIDE 15 MG/1
15 TABLET ORAL DAILY
COMMUNITY
End: 2023-11-14 | Stop reason: SDUPTHER

## 2023-11-14 RX ORDER — FENOFIBRATE 145 MG/1
145 TABLET, FILM COATED ORAL DAILY
COMMUNITY

## 2023-11-14 RX ORDER — ACETAMINOPHEN AND PHENYLEPHRINE HCL 325; 5 MG/1; MG/1
1 TABLET ORAL DAILY
Qty: 90 TABLET | Refills: 1 | Status: SHIPPED | OUTPATIENT
Start: 2023-11-14

## 2023-11-14 RX ORDER — HYDROCORTISONE 1 %
GEL (GRAM) TOPICAL
Qty: 90 TABLET | Refills: 1 | Status: SHIPPED
Start: 2023-11-14 | End: 2023-11-14 | Stop reason: CLARIF

## 2023-11-14 RX ORDER — TRAZODONE HYDROCHLORIDE 50 MG/1
50 TABLET ORAL NIGHTLY
Qty: 90 TABLET | Refills: 1 | Status: SHIPPED | OUTPATIENT
Start: 2023-11-14

## 2023-11-14 NOTE — TELEPHONE ENCOUNTER
Pharmacy calling, They do not have the B12 lozenge. The pharmacist is asking if the patient can have the B12 sublingual? Pharmacy would need a new prescription with directions to take 1 per day etc.     Please advise

## 2023-11-15 ENCOUNTER — HOSPITAL ENCOUNTER (OUTPATIENT)
Dept: CARDIOLOGY | Facility: HOSPITAL | Age: 83
Discharge: HOME | End: 2023-11-15
Payer: MEDICARE

## 2023-11-15 DIAGNOSIS — R00.2 PALPITATIONS: ICD-10-CM

## 2023-11-15 LAB
AORTIC VALVE PEAK VELOCITY: 1.44
AV PEAK GRADIENT: 8.3
AVA (PEAK VEL): 3.39
EJECTION FRACTION APICAL 4 CHAMBER: 60.9
EJECTION FRACTION: 58
LEFT VENTRICLE INTERNAL DIMENSION DIASTOLE: 4.2 (ref 3.5–6)
LEFT VENTRICULAR OUTFLOW TRACT DIAMETER: 2.5
MITRAL VALVE E/A RATIO: 0.87
MITRAL VALVE E/E' RATIO: 9.86
RIGHT VENTRICLE FREE WALL PEAK S': 12.8
RIGHT VENTRICLE PEAK SYSTOLIC PRESSURE: 25.7
TRICUSPID ANNULAR PLANE SYSTOLIC EXCURSION: 2.2

## 2023-11-15 PROCEDURE — 93306 TTE W/DOPPLER COMPLETE: CPT | Performed by: INTERNAL MEDICINE

## 2023-11-15 PROCEDURE — 93306 TTE W/DOPPLER COMPLETE: CPT

## 2023-11-17 LAB
ALBUMIN: 4.4 G/DL (ref 3.4–5)
ALPHA 1 GLOBULIN: 0.3 G/DL (ref 0.2–0.6)
ALPHA 2 GLOBULIN: 0.9 G/DL (ref 0.4–1.1)
BETA GLOBULIN: 0.9 G/DL (ref 0.5–1.2)
GAMMA GLOBULIN: 0.6 G/DL (ref 0.5–1.4)
IMMUNOFIXATION COMMENT: NORMAL
METHYLMALONATE SERPL-SCNC: 0.26 UMOL/L (ref 0–0.4)
PATH REVIEW - SERUM IMMUNOFIXATION: NORMAL
PATH REVIEW-SERUM PROTEIN ELECTROPHORESIS: NORMAL
PROTEIN ELECTROPHORESIS COMMENT: NORMAL

## 2023-11-19 PROBLEM — N18.31 STAGE 3A CHRONIC KIDNEY DISEASE (MULTI): Status: ACTIVE | Noted: 2023-11-19

## 2023-11-21 LAB
ELECTRONICALLY SIGNED BY: NORMAL
FRAGILE X INTERPRETATION: NORMAL
FRAGILE X RESULT: NORMAL

## 2023-11-22 ENCOUNTER — OFFICE VISIT (OUTPATIENT)
Dept: CARDIOLOGY | Facility: CLINIC | Age: 83
End: 2023-11-22
Payer: MEDICARE

## 2023-11-22 VITALS
WEIGHT: 211 LBS | BODY MASS INDEX: 26.24 KG/M2 | DIASTOLIC BLOOD PRESSURE: 70 MMHG | SYSTOLIC BLOOD PRESSURE: 124 MMHG | HEIGHT: 75 IN | HEART RATE: 60 BPM

## 2023-11-22 DIAGNOSIS — N18.30 CHRONIC KIDNEY DISEASE (CKD), ACTIVE MEDICAL MANAGEMENT WITHOUT DIALYSIS, STAGE 3 (MODERATE) (MULTI): ICD-10-CM

## 2023-11-22 DIAGNOSIS — I48.0 PAROXYSMAL ATRIAL FIBRILLATION (MULTI): Primary | ICD-10-CM

## 2023-11-22 DIAGNOSIS — I10 ESSENTIAL HYPERTENSION: ICD-10-CM

## 2023-11-22 DIAGNOSIS — E78.2 MIXED HYPERLIPIDEMIA: ICD-10-CM

## 2023-11-22 DIAGNOSIS — E11.9 TYPE 2 DIABETES MELLITUS WITHOUT COMPLICATION, WITHOUT LONG-TERM CURRENT USE OF INSULIN (MULTI): ICD-10-CM

## 2023-11-22 PROCEDURE — 1036F TOBACCO NON-USER: CPT | Performed by: INTERNAL MEDICINE

## 2023-11-22 PROCEDURE — 1160F RVW MEDS BY RX/DR IN RCRD: CPT | Performed by: INTERNAL MEDICINE

## 2023-11-22 PROCEDURE — 99215 OFFICE O/P EST HI 40 MIN: CPT | Performed by: INTERNAL MEDICINE

## 2023-11-22 PROCEDURE — 1159F MED LIST DOCD IN RCRD: CPT | Performed by: INTERNAL MEDICINE

## 2023-11-22 PROCEDURE — 3074F SYST BP LT 130 MM HG: CPT | Performed by: INTERNAL MEDICINE

## 2023-11-22 PROCEDURE — 3078F DIAST BP <80 MM HG: CPT | Performed by: INTERNAL MEDICINE

## 2023-11-22 RX ORDER — PRAVASTATIN SODIUM 10 MG/1
TABLET ORAL
Qty: 90 TABLET | Refills: 3 | Status: SHIPPED | OUTPATIENT
Start: 2023-11-22

## 2023-11-22 NOTE — PROGRESS NOTES
"Chief Complaint:   Please see below.     History Of Present Illness:    Dakota Manzano is a 83 y.o. male presenting with paroxysmal atrial fibrillation.    This hypertensive, diabetic, hyperlipidemic 83-year-old man reported episodes of dizziness as described in my previous note.  His echocardiogram dated September 27, 2023 revealed an ejection fraction of 55 to 60% with no significant valvular or pericardial disease.  His monitor study disclosed low-volume paroxysmal atrial fibrillation.  He saw his neurologist earlier this month, who suspects vascular dementia; please see the note for complete details.    He feels much better, having vastly increased his water intake to 72 oz per day.  His dizziness is much improved.  He is riding a stationary bike, going 10-12 miles in an hour.  He feels well when he exercises.  The patient denies chest discomfort, dyspnea, palpitations, orthopnea, PND, syncope, and near syncope.     Last Recorded Vitals:  Vitals:    11/22/23 1331   BP: 124/70   BP Location: Left arm   Patient Position: Sitting   BP Cuff Size: Large adult   Pulse: 60   Weight: 95.7 kg (211 lb)   Height: 1.905 m (6' 3\")       Past Medical History:  He has a past medical history of Anesthesia of skin (01/11/2022), Body mass index (BMI) 27.0-27.9, adult (06/07/2021), Disorder of the skin and subcutaneous tissue, unspecified (09/21/2020), Encounter for general adult medical examination without abnormal findings (10/27/2021), Encounter for general adult medical examination without abnormal findings (09/21/2020), Encounter for immunization (10/27/2021), Encounter for removal of sutures (04/05/2021), Encounter for screening for malignant neoplasm of prostate, Encounter for screening for malignant neoplasm of prostate, Ingrowing nail (10/18/2019), Laceration without foreign body of other part of head, initial encounter (04/05/2021), Other conditions influencing health status, Personal history of other diseases of the " nervous system and sense organs, Personal history of other drug therapy (2020), Syncope and collapse (2022), Tinea unguium (10/18/2019), and Type 2 diabetes mellitus without complications (CMS/[facility]) (2019).    Past Surgical History:  He has a past surgical history that includes Other surgical history (2019); Other surgical history (2019); and Other surgical history (2019).      Social History:  He reports that he has never smoked. He has never used smokeless tobacco. He reports that he does not currently use alcohol. He reports that he does not use drugs.    Family History:  Family History   Problem Relation Name Age of Onset    Retinal degeneration Mother      Other () Mother      Dementia Mother      Parkinsonism Mother      Other () Father      Tremor Father      Other () Brother      Heart attack Brother      Tremor Brother      Tremor Paternal Grandfather      Heart disease Other Maternal Relatives     Intellectual Disability Grandchild          Allergies:  Tetanus antitoxin and Tetanus vaccines and toxoid    Outpatient Medications:  Current Outpatient Medications   Medication Instructions    ascorbic acid (VITAMIN C) 1,000 mg, oral, Daily    aspirin 81 mg capsule 1 capsule, oral, Daily    cholecalciferol-soy isoflavone 2,000-64 unit-mg tablet 1 Units, oral, Daily, ( Vitamin D3 )    cyanocobalamin-cobamamide (B-12 Plus) 5,000-100 mcg tablet, sublingual 1 tablet, sublingual, Daily    docusate sodium (Colace) 100 mg capsule 1 capsule 2 TIMES DAILY (route: oral)    donepezil (Aricept) 5 mg tablet 1/2 tab daily for a week, then 1 tab daily    fenofibrate (TRICOR) 145 mg, oral, Daily    levothyroxine (SYNTHROID, LEVOXYL) 88 mcg, oral, Daily    meloxicam (MOBIC) 15 mg, oral, Daily RT    niacin (NIASPAN) 1,000 mg, oral, Nightly, Do not crush, chew, or split.    omega 3-dha-epa-fish oil (Fish OiL) 1,200 (144-216) mg capsule 1 capsule, oral, Daily    tamsulosin  (Flomax) 0.4 mg 24 hr capsule 1 capsule DAILY (route: oral)    traZODone (DESYREL) 50 mg, oral, Nightly       Physical Exam:  GENERAL:  pleasant 83 year-old  HEENT: No xanthelasma  NECK: Supple, no palpable adenopathy or thyromegaly  CHEST: Clear to auscultation, respiratory effort unlabored  CARDIAC: Intermittently irregular, normal S1 and S2, no audible murmur, rub, gallop, carotids are brisk, PMI is not displaced  ABD: Active bowel sounds, nontender, no organomegaly, no evidence of ascites  EXT: No clubbing, cyanosis, edema, or tenderness  NEURO: Awake, alert, appropriate, speech is fluent       Last Labs:  CBC -  Lab Results   Component Value Date    WBC 8.1 08/15/2023    HGB 11.8 (L) 08/15/2023    HCT 37.7 (L) 08/15/2023    MCV 85 08/15/2023     08/15/2023       CMP -  Lab Results   Component Value Date    CALCIUM 9.8 11/14/2023    PROT 7.1 11/14/2023    ALBUMIN 4.4 08/15/2023    AST 20 08/15/2023    ALT 18 08/15/2023    ALKPHOS 38 08/15/2023    BILITOT 0.5 08/15/2023       LIPID PANEL -   Lab Results   Component Value Date    CHOL 223 (H) 08/15/2023    TRIG 349 (H) 08/15/2023    HDL 30.9 (A) 08/15/2023    CHHDL 7.2 (A) 08/15/2023    LDLF 122 (H) 08/15/2023    VLDL 70 (H) 08/15/2023    NHDL 192 08/15/2023       RENAL FUNCTION PANEL -   Lab Results   Component Value Date    GLUCOSE 130 (H) 11/14/2023     11/14/2023    K 4.7 11/14/2023     11/14/2023    CO2 26 11/14/2023    ANIONGAP 14 11/14/2023    BUN 26 (H) 11/14/2023    CREATININE 1.53 (H) 11/14/2023    GFRMALE 55 (A) 08/15/2023    CALCIUM 9.8 11/14/2023    ALBUMIN 4.4 08/15/2023        Lab Results   Component Value Date    HGBA1C 6.4 (H) 11/14/2023         Lab review: I have Chemistry CMP:   Lab Results   Component Value Date    ALBUMIN 4.4 08/15/2023    CALCIUM 9.8 11/14/2023    CO2 26 11/14/2023    CREATININE 1.53 (H) 11/14/2023    GLUCOSE 130 (H) 11/14/2023    BILITOT 0.5 08/15/2023    PROT 7.1 11/14/2023    ALT 18 08/15/2023    AST 20  08/15/2023    ALKPHOS 38 08/15/2023   , Chemistry BMP   Lab Results   Component Value Date    GLUCOSE 130 (H) 11/14/2023    CALCIUM 9.8 11/14/2023    CO2 26 11/14/2023    CREATININE 1.53 (H) 11/14/2023   , CBC:  Lab Results   Component Value Date    WBC 8.1 08/15/2023    RBC 4.43 (L) 08/15/2023    HGB 11.8 (L) 08/15/2023    HCT 37.7 (L) 08/15/2023    MCV 85 08/15/2023    MCHC 31.3 (L) 08/15/2023    RDW 15.9 (H) 08/15/2023    NRBC 0.0 06/28/2022   , and Lipids:   Lab Results   Component Value Date    CHOL 223 (H) 08/15/2023    HDL 30.9 (A) 08/15/2023    TRIG 349 (H) 08/15/2023       Assessment/Plan   Problem List Items Addressed This Visit          Cardiac and Vasculature    Essential hypertension    Mixed hyperlipidemia       Endocrine/Metabolic    Type 2 diabetes mellitus without complication, without long-term current use of insulin (CMS/[facility])     Other Visit Diagnoses       Paroxysmal atrial fibrillation (CMS/[facility])    -  Primary    Relevant Medications    apixaban (Eliquis) 2.5 mg tablet    Other Relevant Orders    Follow Up In Cardiology    Chronic kidney disease (CKD), active medical management without dialysis, stage 3 (moderate) (CMS/[facility])            1.  Low-volume paroxysmal atrial fibrillation.  His [id number] score is at least 4 placing him at high risk for stroke and thromboembolism.  We discussed atrial fibrillation, and its potential causes and management.  Will start Eliquis 2.5 mg twice daily.  2.  Hyperlipidemia: At his advanced age with coexisting disease he has de facto CAD.  Will start pravastatin 10 mg daily.  He is already on fenofibrate which we will not stop.  3.  HTN:  BP is well controlled.   We discussed sodium restriction, lifestyle modification, and the DASH diet.  I advised the patient to check BPs at home daily, and to contact me with an update in one month.    Zheng Nieto MD

## 2023-11-22 NOTE — PATIENT INSTRUCTIONS

## 2023-12-22 ENCOUNTER — OFFICE VISIT (OUTPATIENT)
Dept: DERMATOLOGY | Facility: CLINIC | Age: 83
End: 2023-12-22
Payer: MEDICARE

## 2023-12-22 DIAGNOSIS — D48.5 NEOPLASM OF UNCERTAIN BEHAVIOR OF SKIN: Primary | ICD-10-CM

## 2023-12-22 DIAGNOSIS — L57.8 OTHER SKIN CHANGES DUE TO CHRONIC EXPOSURE TO NONIONIZING RADIATION: ICD-10-CM

## 2023-12-22 DIAGNOSIS — L82.1 SEBORRHEIC KERATOSIS: ICD-10-CM

## 2023-12-22 PROCEDURE — 88305 TISSUE EXAM BY PATHOLOGIST: CPT | Mod: TC,DER | Performed by: STUDENT IN AN ORGANIZED HEALTH CARE EDUCATION/TRAINING PROGRAM

## 2023-12-22 PROCEDURE — 88305 TISSUE EXAM BY PATHOLOGIST: CPT | Performed by: DERMATOLOGY

## 2023-12-22 PROCEDURE — 1036F TOBACCO NON-USER: CPT | Performed by: STUDENT IN AN ORGANIZED HEALTH CARE EDUCATION/TRAINING PROGRAM

## 2023-12-22 PROCEDURE — 1159F MED LIST DOCD IN RCRD: CPT | Performed by: STUDENT IN AN ORGANIZED HEALTH CARE EDUCATION/TRAINING PROGRAM

## 2023-12-22 PROCEDURE — 11102 TANGNTL BX SKIN SINGLE LES: CPT | Performed by: STUDENT IN AN ORGANIZED HEALTH CARE EDUCATION/TRAINING PROGRAM

## 2023-12-22 PROCEDURE — 1160F RVW MEDS BY RX/DR IN RCRD: CPT | Performed by: STUDENT IN AN ORGANIZED HEALTH CARE EDUCATION/TRAINING PROGRAM

## 2023-12-22 PROCEDURE — 11103 TANGNTL BX SKIN EA SEP/ADDL: CPT | Performed by: STUDENT IN AN ORGANIZED HEALTH CARE EDUCATION/TRAINING PROGRAM

## 2023-12-22 PROCEDURE — 99203 OFFICE O/P NEW LOW 30 MIN: CPT | Performed by: STUDENT IN AN ORGANIZED HEALTH CARE EDUCATION/TRAINING PROGRAM

## 2023-12-22 NOTE — PROGRESS NOTES
Subjective   Dakota Manzano is a 83 y.o. male who presents for the following: Suspicious Skin Lesion (Right neck, left upper arm).  Skin Lesion  He describes it as a growth and light spot, that is located on his neck and left upper arm .  It was first noticed 2 years ago. It has been causing  bothersome (neck)  and is sheds skin (Left Upper Arm). Previously these spot have never been  treated .  Other spots that are concerning: none      Objective   Well appearing patient in no apparent distress; mood and affect are within normal limits.    Mottled pigmentation, telangiectasias and brown reticular macules in sun exposed areas on the face, extremities, trunk    Stuck on verrucous, tan-brown papules and plaques.      Left Upper Arm - Anterior  13 mm pink scaly patch          Right Posterior Neck  1 cm eroded plaque            Assessment/Plan   Neoplasm of uncertain behavior of skin (2)  Left Upper Arm - Anterior    Lesion biopsy  Type of biopsy: tangential    Informed consent: discussed and consent obtained    Timeout: patient name, date of birth, surgical site, and procedure verified    Procedure prep:  Patient was prepped and draped  Anesthesia: the lesion was anesthetized in a standard fashion    Anesthetic:  1% lidocaine w/ epinephrine 1-100,000 local infiltration  Instrument used: DermaBlade    Hemostasis achieved with: aluminum chloride    Outcome: patient tolerated procedure well    Post-procedure details: sterile dressing applied and wound care instructions given    Dressing type: petrolatum and bandage      Staff Communication: Dermatology Local Anesthesia: 1 % Lidocaine / Epinephrine - Amount: 1 ml    Specimen 1 - Dermatopathology- DERM LAB  Differential Diagnosis: r/o nmsc  Check Margins Yes/No?:    Comments:    Dermpath Lab: Routine Histopathology (formalin-fixed tissue)    Right Posterior Neck    Staff Communication: Dermatology Local Anesthesia: 1 % Lidocaine / Epinephrine - Amount: 1 ml    Lesion  biopsy  Type of biopsy: tangential    Informed consent: discussed and consent obtained    Timeout: patient name, date of birth, surgical site, and procedure verified    Procedure prep:  Patient was prepped and draped  Anesthesia: the lesion was anesthetized in a standard fashion    Anesthetic:  1% lidocaine w/ epinephrine 1-100,000 local infiltration  Instrument used: DermaBlade    Hemostasis achieved with: aluminum chloride    Outcome: patient tolerated procedure well    Post-procedure details: sterile dressing applied and wound care instructions given    Dressing type: petrolatum and bandage      Specimen 2 - Dermatopathology- DERM LAB  Differential Diagnosis: r/o nmsc  Check Margins Yes/No?:    Comments:    Dermpath Lab: Routine Histopathology (formalin-fixed tissue)    Concerning lesions found on exam. DDX for lesion includes: r.o nmsc. The need for biopsy to aid in diagnosis was discussed and recommended. Risks and benefits of biopsy were reviewed. See procedure note.    Related Procedures  Follow Up In Dermatology - Established Patient    Other skin changes due to chronic exposure to nonionizing radiation    The risk of chronic, cumulative sun damage and risk of development of skin cancer was reviewed with the patient today. Discussed important of sun protection with sun protective clothing and/or broad spectrum sunscreen spf 30 or above.  Warning signs of skin cancer were reviewed. Patient to contact office should they notice any new or changing pre-existing skin lesion.    Seborrheic keratosis    The benign nature of these skin lesions were reviewed with the patient today and reassurance was provided. Patient advised to return for f/u if the lesions change in size, shape, color, become painful, tender, itch or bleed.

## 2023-12-27 LAB
LABORATORY COMMENT REPORT: NORMAL
PATH REPORT.FINAL DX SPEC: NORMAL
PATH REPORT.GROSS SPEC: NORMAL
PATH REPORT.MICROSCOPIC SPEC OTHER STN: NORMAL
PATH REPORT.RELEVANT HX SPEC: NORMAL
PATH REPORT.TOTAL CANCER: NORMAL

## 2024-01-03 DIAGNOSIS — C44.519 BASAL CELL CARCINOMA (BCC) OF SKIN OF OTHER PART OF TORSO: ICD-10-CM

## 2024-01-03 DIAGNOSIS — C44.41 BASAL CELL CARCINOMA (BCC) OF SKIN OF NECK: Primary | ICD-10-CM

## 2024-01-03 DIAGNOSIS — C44.320 SQUAMOUS CELL CARCINOMA OF SKIN OF FACE: ICD-10-CM

## 2024-01-11 ENCOUNTER — TELEPHONE (OUTPATIENT)
Dept: DERMATOLOGY | Facility: CLINIC | Age: 84
End: 2024-01-11
Payer: MEDICARE

## 2024-01-11 NOTE — TELEPHONE ENCOUNTER
Received a VM from son-in-law, Tammy Tadeo, requesting an appointment with Dr. Mueller for a growth on his arm.  Will send to Clerical Team to call and schedule accordingly

## 2024-01-18 ENCOUNTER — APPOINTMENT (OUTPATIENT)
Dept: UROLOGY | Facility: CLINIC | Age: 84
End: 2024-01-18
Payer: MEDICARE

## 2024-01-19 ENCOUNTER — APPOINTMENT (OUTPATIENT)
Dept: DERMATOLOGY | Facility: CLINIC | Age: 84
End: 2024-01-19
Payer: MEDICARE

## 2024-02-14 ENCOUNTER — OFFICE VISIT (OUTPATIENT)
Dept: DERMATOLOGY | Facility: CLINIC | Age: 84
End: 2024-02-14
Payer: MEDICARE

## 2024-02-14 VITALS — HEART RATE: 76 BPM | SYSTOLIC BLOOD PRESSURE: 126 MMHG | DIASTOLIC BLOOD PRESSURE: 78 MMHG

## 2024-02-14 DIAGNOSIS — C44.41 BASAL CELL CARCINOMA (BCC) OF SKIN OF NECK: ICD-10-CM

## 2024-02-14 PROCEDURE — 13132 CMPLX RPR F/C/C/M/N/AX/G/H/F: CPT | Performed by: DERMATOLOGY

## 2024-02-14 PROCEDURE — 3074F SYST BP LT 130 MM HG: CPT | Performed by: DERMATOLOGY

## 2024-02-14 PROCEDURE — 17311 MOHS 1 STAGE H/N/HF/G: CPT | Performed by: DERMATOLOGY

## 2024-02-14 PROCEDURE — 1159F MED LIST DOCD IN RCRD: CPT | Performed by: DERMATOLOGY

## 2024-02-14 PROCEDURE — 3078F DIAST BP <80 MM HG: CPT | Performed by: DERMATOLOGY

## 2024-02-14 PROCEDURE — 1036F TOBACCO NON-USER: CPT | Performed by: DERMATOLOGY

## 2024-02-14 NOTE — PROGRESS NOTES
Office Visit Note  Date: 2/14/2024  Surgeon:  Ortiz Pfeiffer MD PhD  Office Location: [address] [address]  Dept: [phone]  Dept Fax: [phone]  Referring Provider: Viktoria Mueller MD  [facility]  [address], [address],  [facility]    Subjective   Dakota Manzano is a 83 y.o. male who presents for the following: MOHS Surgery (Basal Cell Carcinoma, Right Posterior Neck)    According to the patient, the lesion has been present for approximately greater than 1 year at the time of diagnosis.  The lesion is scabbing, has a burning sensation, and non-healing.  The lesion has not been treated previously.    The patient does not have a pacemaker / defibrillator.  The patient does not have a heart valve / joint replacement.    The patient is not on blood thinners.  The patient does not have a history of hepatitis B or C.  The patient does not have a history of HIV.  The patient does not have a history of immunosuppression (e.g. organ transplantation, malignancy, medications)    Review of Systems:  No other skin or systemic complaints other than what is documented elsewhere in the note.    MEDICAL HISTORY: clinically relevant history including significant past medical history, medications and allergies was reviewed and documented in Epic.    Objective   Well appearing patient in no apparent distress; mood and affect are within normal limits.  Vital signs: See record.  Noted on the Right Posterior Neck  Is a 1.4 x 0.9 cm scar                      The patient confirmed the identified site.    Discussion:  The nature of the diagnosis was explained. The lesion is a skin cancer.  It has a risk of local growth and distant spread. The condition is associated with sun exposure.  Warning signs of non-melanoma skin cancer discussed. Patient was instructed to perform monthly self skin examination.  We recommended that the patient have regular full skin exams  given an increased risk of subsequent skin cancers. The patient was instructed to use sun protective behaviors including use of broad spectrum sunscreens and sun protective clothing to reduce risk of skin cancers.      Risks, benefits, side effects of Mohs surgery were discussed with patient and the patient voiced understanding.  It was explained that even though the cure rate of Mohs is very high it is not 100%. Risks of surgery including but not limited to bleeding, infection, numbness, nerve damage, and scar were reviewed.  Discussion included wound care requirements, activity restrictions, likely scar outcome and time to heal.     After Mohs surgery, the defect may need to be repaired surgically and the scar may be longer than the original lesion.  Reconstruction options, risks, and benefits were reviewed including second intention healing, linear repair (4-1 ratio was explained), local flaps, skin grafts, cartilage grafts and interpolation flaps (the need for multiple surgeries was explained). Possible outcomes were reviewed including likely scar appearance, failure of flap survival, infection, bleeding and the need for revision surgery.     The pathology was reviewed, the photograph was reviewed, and the referring physician's note was reviewed.    Patient elected for Mohs surgery.

## 2024-02-14 NOTE — PROGRESS NOTES
Mohs Surgery Operative Note    Date of Surgery:  2/14/2024  Surgeon:  Ortiz Pfeiffer MD PhD  Office Location: [address] [address]  Dept: [phone]  Dept Fax: [phone]  Referring Provider: Viktoria Mueller MD  [address] [place] [address]  [address], [address] [address]      Assessment/Plan   Pre-procedure:   Obtained informed consent: written from patient  The surgical site was identified and confirmed with the patient.     Intra-operative:   Audible time out called at : 4:44 PM 02/14/24  by: Mauro Solorzano MA   Verified patient name, birthdate, site, specimen bottle label & requisition.    The planned procedure(s) was again reviewed with the patient. The risks of bleeding, infection, nerve damage and scarring were reviewed. Written authorization was obtained. The patient identity, surgical site, and planned procedure(s) were verified. The provider acted as both surgeon and pathologist.     Basal cell carcinoma (BCC) of skin of neck  Right Posterior Neck    Mohs surgery    Consent obtained: written    Universal Protocol:  Procedure explained and questions answered to patient or proxy's satisfaction: Yes    Test results available and properly labeled: Yes    Pathology report reviewed: Yes    External notes reviewed: Yes    Photo or diagram used for site identification: Yes    Site/side marked: Yes    Slide independently reviewed by Mohs surgeon: Yes    Immediately prior to procedure a time out was called: Yes    Patient identity confirmed: verbally with patient  Preparation: Patient was prepped and draped in usual sterile fashion      Anticoagulation:  Is the patient taking prescription anticoagulant and/or aspirin prescribed/recommended by a physician? Yes    Was the anticoagulation regimen changed prior to Mohs? No      Anesthesia:  Anesthesia method: local infiltration  Local anesthetic: lidocaine 1% WITH epi and sodium bicarbonate    Procedure  Details:  Biopsy accession number: [id number]  Date of biopsy: 12/22/2023  Frozen section biopsy performed: No    Specimen debulked: Yes    Pre-Op diagnosis: basal cell carcinoma  BCC subtype: nodular  Surgery side: right  Surgical site (from skin exam): Right Posterior Neck  Pre-operative length (cm): 1.4  Pre-operative width (cm): 0.9  Indications for Mohs surgery: anatomic location where tissue conservation is critical  Previously treated? No      Micrographic Surgery Details:  Post-operative length (cm): 2  Post-operative width (cm): 2  Number of Mohs stages: 1    Stage 1     Comments: The patient was brought into the operating room and placed in the procedure chair in the appropriate position.  The area positive by previous biopsy was identified and confirmed with the patient. The area of clinically obvious tumor was debulked using a curette and/or scalpel as needed. An incision was made following the Mohs approach through the skin. The specimen was taken to the lab, divided into 2 piece(s) and appropriately chromacoded and processed.                 Tumor features identified on Mohs section: no tumor identified    Depth of defect: subcutaneous fat    Patient tolerance of procedure: tolerated well, no immediate complications    Reconstruction:  Was the defect reconstructed? Yes    Was reconstruction performed by the same Mohs surgeon? Yes    Setting of reconstruction: outpatient office  When was reconstruction performed? same day  Type of reconstruction: linear  Linear reconstruction: complex  Length of linear repair (cm): 6  Subcutaneous Layers (Deep Stitches)   Suture size:  3-0  Suture type:  Vicryl  Stitches:  Buried vertical mattress  Fine/surface layer approximation (top stitches)   Epidermal/Superficial suture size:  5-0  Epidermal/Superficial suture type:  Fast-absorbing gut  Stitches: simple running    Hemostasis achieved with: electrodesiccation  Outcome: patient tolerated procedure well with no  complications    Post-procedure details: sterile dressing applied and wound care instructions given    Dressing type: petrolatum, Telfa pad and pressure dressing          Complex Linear Repair - Wide Undermining:  Given the location and size of the defect, it was determined that a complex layered linear closure was required to restore normal anatomy and function. The repair was considered complex because extensive undermining was required and performed. The amount of undermining performed was greater than the maximum width of the defect as measured perpendicular to the closure line along at least one entire edge of the defect. Standing cutaneous cones were removed using Burow's triangles. The wound edges were brought into close approximation with buried vertical mattress sutures. The remainder of the wound was then closed with epidermal top sutures.        The final repair measured 6.0 cm    Wound care was discussed, and the patient was given written post-operative wound care instructions.      The patient will follow up with Ortiz Pfeiffer MD PhD as needed for any post operative problems or concerns, and will follow up with their primary dermatologist as scheduled.

## 2024-02-27 DIAGNOSIS — E11.9 TYPE 2 DIABETES MELLITUS WITHOUT COMPLICATION, WITHOUT LONG-TERM CURRENT USE OF INSULIN (MULTI): ICD-10-CM

## 2024-02-27 RX ORDER — PIOGLITAZONEHYDROCHLORIDE 15 MG/1
15 TABLET ORAL DAILY
Qty: 90 TABLET | Refills: 0 | Status: SHIPPED | OUTPATIENT
Start: 2024-02-27 | End: 2024-06-05

## 2024-03-07 ENCOUNTER — OFFICE VISIT (OUTPATIENT)
Dept: NEUROLOGY | Facility: CLINIC | Age: 84
End: 2024-03-07
Payer: MEDICARE

## 2024-03-07 VITALS
HEIGHT: 75 IN | WEIGHT: 211 LBS | HEART RATE: 89 BPM | DIASTOLIC BLOOD PRESSURE: 82 MMHG | BODY MASS INDEX: 26.24 KG/M2 | SYSTOLIC BLOOD PRESSURE: 124 MMHG

## 2024-03-07 DIAGNOSIS — G25.0 ESSENTIAL TREMOR: ICD-10-CM

## 2024-03-07 DIAGNOSIS — F02.80 MIXED ALZHEIMER'S AND VASCULAR DEMENTIA (MULTI): Primary | ICD-10-CM

## 2024-03-07 DIAGNOSIS — F01.50 MIXED DEMENTIA (MULTI): ICD-10-CM

## 2024-03-07 DIAGNOSIS — G62.9 NEUROPATHY: ICD-10-CM

## 2024-03-07 DIAGNOSIS — G30.9 MIXED ALZHEIMER'S AND VASCULAR DEMENTIA (MULTI): Primary | ICD-10-CM

## 2024-03-07 DIAGNOSIS — F01.50 MIXED ALZHEIMER'S AND VASCULAR DEMENTIA (MULTI): Primary | ICD-10-CM

## 2024-03-07 DIAGNOSIS — G30.9 MIXED DEMENTIA (MULTI): ICD-10-CM

## 2024-03-07 DIAGNOSIS — F02.80 MIXED DEMENTIA (MULTI): ICD-10-CM

## 2024-03-07 PROBLEM — M25.512 PAIN OF LEFT SHOULDER REGION: Status: ACTIVE | Noted: 2023-10-05

## 2024-03-07 PROBLEM — H90.3 SENSORINEURAL HEARING LOSS (SNHL) OF BOTH EARS: Status: ACTIVE | Noted: 2024-03-07

## 2024-03-07 PROBLEM — G20.C PARKINSONISM (MULTI): Status: ACTIVE | Noted: 2023-11-14

## 2024-03-07 PROBLEM — N28.9 DISORDER OF KIDNEY: Status: ACTIVE | Noted: 2022-06-27

## 2024-03-07 PROBLEM — E11.22 STAGE 1 CHRONIC KIDNEY DISEASE DUE TO TYPE 2 DIABETES MELLITUS (MULTI): Status: ACTIVE | Noted: 2022-06-27

## 2024-03-07 PROBLEM — I48.0 PAROXYSMAL ATRIAL FIBRILLATION (MULTI): Status: ACTIVE | Noted: 2024-03-07

## 2024-03-07 PROBLEM — N18.31 STAGE 3A CHRONIC KIDNEY DISEASE (MULTI): Status: ACTIVE | Noted: 2022-06-28

## 2024-03-07 PROBLEM — R41.3 AMNESIA: Status: ACTIVE | Noted: 2023-08-31

## 2024-03-07 PROBLEM — Z96.659 ARTIFICIAL KNEE JOINT PRESENT: Status: ACTIVE | Noted: 2022-06-27

## 2024-03-07 PROBLEM — L82.1 SEBORRHEIC KERATOSIS: Status: ACTIVE | Noted: 2024-03-07

## 2024-03-07 PROBLEM — I99.8 VASCULAR INSUFFICIENCY: Status: ACTIVE | Noted: 2023-10-05

## 2024-03-07 PROBLEM — I95.1 ORTHOSTATIC HYPOTENSION: Status: ACTIVE | Noted: 2023-08-14

## 2024-03-07 PROBLEM — S82.90XA FRACTURE OF LOWER EXTREMITY: Status: ACTIVE | Noted: 2022-06-27

## 2024-03-07 PROBLEM — J34.89 NASAL DISCHARGE: Status: ACTIVE | Noted: 2023-10-05

## 2024-03-07 PROBLEM — L57.9 SKIN CHANGES DUE TO CHRONIC EXPOSURE TO NONIONIZING RADIATION: Status: ACTIVE | Noted: 2024-03-07

## 2024-03-07 PROBLEM — M79.673 PAIN OF FOOT: Status: ACTIVE | Noted: 2023-10-05

## 2024-03-07 PROBLEM — R26.9 ABNORMAL GAIT: Status: ACTIVE | Noted: 2023-08-14

## 2024-03-07 PROBLEM — E53.8 COBALAMIN DEFICIENCY: Status: ACTIVE | Noted: 2023-10-05

## 2024-03-07 PROBLEM — L98.9 SKIN LESION: Status: ACTIVE | Noted: 2023-10-05

## 2024-03-07 PROBLEM — H93.12 SUBJECTIVE TINNITUS OF LEFT EAR: Status: ACTIVE | Noted: 2023-09-20

## 2024-03-07 PROBLEM — I12.9 CHRONIC KIDNEY DISEASE DUE TO HYPERTENSION: Status: ACTIVE | Noted: 2022-06-27

## 2024-03-07 PROBLEM — R60.0 EDEMA OF BOTH LOWER EXTREMITIES: Status: ACTIVE | Noted: 2023-10-05

## 2024-03-07 PROBLEM — N18.1 STAGE 1 CHRONIC KIDNEY DISEASE DUE TO TYPE 2 DIABETES MELLITUS (MULTI): Status: ACTIVE | Noted: 2022-06-27

## 2024-03-07 PROBLEM — D48.5 NEOPLASM OF UNCERTAIN BEHAVIOR OF SKIN: Status: ACTIVE | Noted: 2024-03-07

## 2024-03-07 PROCEDURE — 3074F SYST BP LT 130 MM HG: CPT | Performed by: STUDENT IN AN ORGANIZED HEALTH CARE EDUCATION/TRAINING PROGRAM

## 2024-03-07 PROCEDURE — G2211 COMPLEX E/M VISIT ADD ON: HCPCS | Performed by: STUDENT IN AN ORGANIZED HEALTH CARE EDUCATION/TRAINING PROGRAM

## 2024-03-07 PROCEDURE — 1036F TOBACCO NON-USER: CPT | Performed by: STUDENT IN AN ORGANIZED HEALTH CARE EDUCATION/TRAINING PROGRAM

## 2024-03-07 PROCEDURE — 3079F DIAST BP 80-89 MM HG: CPT | Performed by: STUDENT IN AN ORGANIZED HEALTH CARE EDUCATION/TRAINING PROGRAM

## 2024-03-07 PROCEDURE — 1160F RVW MEDS BY RX/DR IN RCRD: CPT | Performed by: STUDENT IN AN ORGANIZED HEALTH CARE EDUCATION/TRAINING PROGRAM

## 2024-03-07 PROCEDURE — 1159F MED LIST DOCD IN RCRD: CPT | Performed by: STUDENT IN AN ORGANIZED HEALTH CARE EDUCATION/TRAINING PROGRAM

## 2024-03-07 PROCEDURE — 99214 OFFICE O/P EST MOD 30 MIN: CPT | Performed by: STUDENT IN AN ORGANIZED HEALTH CARE EDUCATION/TRAINING PROGRAM

## 2024-03-07 RX ORDER — GABAPENTIN 300 MG/1
CAPSULE ORAL
Qty: 90 CAPSULE | Refills: 5 | Status: SHIPPED | OUTPATIENT
Start: 2024-03-07

## 2024-03-07 RX ORDER — DONEPEZIL HYDROCHLORIDE 10 MG/1
TABLET, FILM COATED ORAL
Qty: 30 TABLET | Refills: 5 | Status: SHIPPED
Start: 2024-03-07 | End: 2024-05-08 | Stop reason: SDUPTHER

## 2024-03-07 ASSESSMENT — ENCOUNTER SYMPTOMS
DEPRESSION: 0
LOSS OF SENSATION IN FEET: 1
OCCASIONAL FEELINGS OF UNSTEADINESS: 1

## 2024-03-07 ASSESSMENT — PATIENT HEALTH QUESTIONNAIRE - PHQ9
1. LITTLE INTEREST OR PLEASURE IN DOING THINGS: NOT AT ALL
SUM OF ALL RESPONSES TO PHQ9 QUESTIONS 1 & 2: 0
2. FEELING DOWN, DEPRESSED OR HOPELESS: NOT AT ALL

## 2024-03-07 ASSESSMENT — VISUAL ACUITY: VA_NORMAL: 1

## 2024-03-07 NOTE — PATIENT INSTRUCTIONS
Thank you for your visit today. You were seen by Dr. Oneill for dementia and neuropathy. If you have any questions or need to reach me, call my office at [phone].    We discussed the following plan today:   Start gabapentin for neuropathic pain. 300 mg at night for a week, then 300 mg twice daily for a week, then 300 mg 3 times a day. Possible side effects include drowsiness and swelling. Call if you feel it is not helpful enough after you are at goal dose for 2 weeks.   Raise donepezil to 10 mg daily  Return in 4 months    General brain health guidelines:  - make sure your other medical conditions are well controlled (e.g., high blood pressure, high cholesterol, diabetes, sleep apnea etc)  - do not smoke or chew tobacco  - address any sensory deficits (e.g., proper glasses for poor eyesight, hearing aids for hearing loss)  - use a weekly pill box  - eat a heart healthy diet (e.g., lots of fruits and vegetables, low fat and cholesterol)  - exercise at least four days per week, 30 minutes at a time at an intensity that would make it difficult to converse with someone   - make sure you are getting at least 7 hours of quality sleep per night  - keep yourself mentally active daily by reading, playing cards, doing word searches, puzzles, etc.  - stay socially active by being part of a group or organization

## 2024-03-07 NOTE — PROGRESS NOTES
Subjective     Dakota Manzano is a left handed  83 y.o. year old male, 12 years formal education, formerly worked for [employer], retired 20 years ago, who presents with Follow-up (4 mon F/U Mixed dementia). Patient is accompanied by: child daughter  he lives alone but next door to daughter.  Visit type: FUV    He is unsure if donepezil has helped so far. No side effects. He is bothered by numbness and pain in his feet which sometimes involves calves as well. It feels like burning and tingling as well. The distribution of numbness has not changed much over time. No interval changes to PMH. He feels memory is stable. No hallucinations. He has not fallen. Tremor does not bother him. He is able to use utensils without spilling.    Prior HX:  He has had memory decline for around 5 years, slowly progressive. For instance he forgets addresses, has gotten lost on a few occassions, has had to stop driving. He repeats himself with questions every day. He continues to manage his own finances, cooking, shopping. Dtr helps with managing his medicines in pill box as well as any paperwork. No VH. No cognitive fluctuation. No personality changes, food fads. He sleeps on/off because of frequent awakening. Has had tremor for past 5-10 years. No RBD symptoms. He has a hx of B12 deficiency 154 in 2021.    Prior treatment:  Sinemet has not helped    He has known orthostatic hypotension but has not had syncope for the past year. He has DM2 for 30 years and numbness in soles of feet. He has a lot of neck pain and spasm. He has bladder frequency and OAB symptoms. He has diffuse weakness.    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)    Difficulty sleeping    Dizziness    Elevated PSA    Erectile dysfunction    Essential hypertension    Essential tremor    Hypothyroidism due to  acquired atrophy of thyroid    Gait disturbance    Fatty liver    Mixed hyperlipidemia    Muscle spasms of neck    Neuritis of left foot    Neuritis of right foot    Osteoporosis    Primary gout    Primary insomnia    Primary osteoarthritis involving multiple joints    Primary orthostatic hypotension    Renal insufficiency    Skin cancer, basal cell    Status post total left knee replacement    Type 2 diabetes mellitus without complication, without long-term current use of insulin (CMS/HCC)    Anxiety disorder, unspecified    Hyperlipidemia, unspecified    Kidney disease    Hypertensive chronic kidney disease w stg 1-4/unsp chr kdny    Long term (current) use of oral hypoglycemic drugs    Type 2 diabetes mellitus with diabetic chronic kidney disease (CMS/HCC)    Fracture of leg    Asymmetrical sensorineural hearing loss    Bilateral leg edema    Foot pain    Knee pain    Other abnormalities of gait and mobility    Other acute sinusitis    Other amnesia    Other specified hypothyroidism    Presence of left artificial knee joint    Primary osteoarthritis of left knee    Shoulder pain, left    Skin lesions    Runny nose    Sneezing    Sore throat    Subjective tinnitus, left    Tremor    Venous insufficiency    Vestibular dizziness    Vitamin B12 deficiency    Headache    Lower back pain    Dizziness and giddiness    Lightheadedness    Benign hypertension    Hyperlipidemia    Other hyperlipidemia    Insomnia    Type 2 diabetes mellitus without complication (CMS/HCC)    Vitamin D deficiency    Stage 3a chronic kidney disease (CMS/HCC)    Mixed Alzheimer's and vascular dementia (CMS/HCC)    Neoplasm of uncertain behavior of skin    Parkinsonism    Paroxysmal atrial fibrillation (CMS/HCC)    Seborrheic keratosis    Skin changes due to chronic exposure to nonionizing radiation    Sensorineural hearing loss (SNHL) of both ears    Edema of both lower extremities    Stage 1 chronic kidney disease due to type 2 diabetes  mellitus (CMS/[facility])    Primary hypertension    Neuropathy    Pain of foot    Fracture of lower extremity    Abnormal gait    Chronic kidney disease due to hypertension    Disorder of kidney    Amnesia    Artificial knee joint present    Orthostatic hypotension    Nasal discharge    Pain of left shoulder region    Skin lesion    Subjective tinnitus of left ear    Vascular insufficiency    Cobalamin deficiency      Past Medical History:   Diagnosis Date    Anesthesia of skin 01/11/2022    Arm numbness left    Body mass index (BMI) 27.0-27.9, adult 06/07/2021    BMI 27.0-27.9,adult    Disorder of the skin and subcutaneous tissue, unspecified 09/21/2020    Face lesion    Encounter for general adult medical examination without abnormal findings 10/27/2021    Encounter for Medicare annual wellness exam    Encounter for general adult medical examination without abnormal findings 09/21/2020    Encounter for Medicare annual wellness exam    Encounter for immunization 10/27/2021    Encounter for immunization    Encounter for removal of sutures 04/05/2021    Visit for suture removal    Encounter for screening for malignant neoplasm of prostate     Screening PSA (prostate specific antigen)    Encounter for screening for malignant neoplasm of prostate     Encounter for prostate cancer screening    Ingrowing nail 10/18/2019    Ingrown toenail    Laceration without foreign body of other part of head, initial encounter 04/05/2021    Forehead laceration, initial encounter    Other conditions influencing health status     No significant past medical history    Personal history of other diseases of the nervous system and sense organs     History of eye problem    Personal history of other drug therapy 09/21/2020    History of influenza vaccination    Syncope and collapse 01/11/2022    Blackout    Tinea unguium 10/18/2019    Toenail fungus    Type 2 diabetes mellitus without complications (CMS/[facility]) 04/08/2019    Type 2 diabetes mellitus  without complication, unspecified whether long term insulin use      Past Surgical History:   Procedure Laterality Date    OTHER SURGICAL HISTORY  2019    Appendectomy    OTHER SURGICAL HISTORY  2019    Hernia repair    OTHER SURGICAL HISTORY  2019    Leg surgery      Social History     Socioeconomic History    Marital status:      Spouse name: Not on file    Number of children: Not on file    Years of education: Not on file    Highest education level: Not on file   Occupational History    Not on file   Tobacco Use    Smoking status: Never    Smokeless tobacco: Never   Vaping Use    Vaping Use: Never used   Substance and Sexual Activity    Alcohol use: Not Currently    Drug use: Never    Sexual activity: Defer   Other Topics Concern    Not on file   Social History Narrative    Not on file     Social Determinants of Health     Financial Resource Strain: Not on file   Food Insecurity: Not on file   Transportation Needs: Not on file   Physical Activity: Not on file   Stress: Not on file   Social Connections: Not on file   Intimate Partner Violence: Not on file   Housing Stability: Not on file      Family History   Problem Relation Name Age of Onset    Retinal degeneration Mother      Other () Mother      Dementia Mother      Parkinsonism Mother      Other () Father      Tremor Father      Other () Brother      Heart attack Brother      Tremor Brother      Tremor Paternal Grandfather      Heart disease Other Maternal Relatives     Intellectual Disability Grandchild        Patient Health Questionnaire-2 Score: 0          Review of Systems  All other system have been reviewed and are negative for complaint.  Objective   Neurological Exam    Cranial Nerves  CN II: Visual acuity is normal. Visual fields full to confrontation.  CN III, IV, VI: Extraocular movements intact bilaterally. Normal lids and orbits bilaterally. Pupils equal round and reactive to light bilaterally.  CN  V: Facial sensation is normal.  CN VII: Full and symmetric facial movement.  CN VIII: Hearing is normal.  CN IX, X: Palate elevates symmetrically. Normal gag reflex.  CN XI: Shoulder shrug strength is normal.  CN XII: Tongue midline without atrophy or fasciculations.    Motor  Normal muscle bulk throughout. Strength is 5/5 throughout all four extremities.  Mild symmetric bradykinesia  Paratonic suspect normal tone  Fast kinetic > postural tremor of hands.    Sensory  Reduced pinprick at distal toes, preserved proximally. Complete pallanesthesia of lower extremities, reduced at fingers.     Reflexes                                            Right                      Left  Biceps                                 2+                         2+  Triceps                                1+                         1+  Patellar                                1+                         1+  Achilles                                0                         0  Right Plantar: downgoing  Left Plantar: downgoing    Coordination  Right: Finger-to-nose normal.Left: Finger-to-nose normal.    Gait    Reduced R>L armswing, wide based, good stride, unable to tandem  Positive romberg.                          Hemoglobin A1C   Date Value Ref Range Status   11/14/2023 6.4 (H) see below % Final     Estimated Average Glucose   Date Value Ref Range Status   11/14/2023 137 Not Established mg/dL Final     Thyroid Stimulating Hormone   Date Value Ref Range Status   11/14/2023 5.29 (H) 0.44 - 3.98 mIU/L Final     Folate, Serum   Date Value Ref Range Status   11/14/2023 13.7 >5.0 ng/mL Final   No MRI head results found for the past 12 months       Assessment/Plan     Dakota Manzano is a 83 y.o. year old male with longstanding DM2, neuropathy, orthostatic hypotension, hx of treated B12 deficiency, ET, here for FUV progressive cognitive decline for the past 5 years. He seems to have prominent and early visuospatial and executive deficits, but also  amnestic deficits. His MOCA was 10/30. His MRI showed moderate burden of small vessel ischemic changes as well as some hippocampal atrophy. I suspect he has a component of vascular cognitive impairment. His exam additionally does show prominent action tremor and mild bradykinesia. He has several family members with tremor, intellectual disability, dementia, and parkinsonism. He may have Alzheimer vs DLB. I offered neuropsychology testing be he declined. He asked to try gabapentin for neuropathy pain - this seems stable over time.    We discussed the following plan today:   Start gabapentin for neuropathic pain. 300 mg at night for a week, then 300 mg twice daily for a week, then 300 mg 3 times a day. Possible side effects include drowsiness and swelling. Call if you feel it is not helpful enough after you are at goal dose for 2 weeks.   Raise donepezil to 10 mg daily  Return in 4 months

## 2024-03-18 ENCOUNTER — OFFICE VISIT (OUTPATIENT)
Dept: DERMATOLOGY | Facility: CLINIC | Age: 84
End: 2024-03-18
Payer: MEDICARE

## 2024-03-18 DIAGNOSIS — D48.5 NEOPLASM OF UNCERTAIN BEHAVIOR OF SKIN: ICD-10-CM

## 2024-03-18 DIAGNOSIS — C44.619 BASAL CELL CARCINOMA (BCC) OF LEFT UPPER EXTREMITY: Primary | ICD-10-CM

## 2024-03-18 PROCEDURE — 1036F TOBACCO NON-USER: CPT | Performed by: STUDENT IN AN ORGANIZED HEALTH CARE EDUCATION/TRAINING PROGRAM

## 2024-03-18 PROCEDURE — 17263 DSTRJ MAL LES T/A/L 2.1-3.0: CPT | Performed by: STUDENT IN AN ORGANIZED HEALTH CARE EDUCATION/TRAINING PROGRAM

## 2024-03-18 PROCEDURE — 1159F MED LIST DOCD IN RCRD: CPT | Performed by: STUDENT IN AN ORGANIZED HEALTH CARE EDUCATION/TRAINING PROGRAM

## 2024-03-18 PROCEDURE — 1160F RVW MEDS BY RX/DR IN RCRD: CPT | Performed by: STUDENT IN AN ORGANIZED HEALTH CARE EDUCATION/TRAINING PROGRAM

## 2024-03-18 NOTE — PROGRESS NOTES
Excision Operative Note    Date of Surgery:  3/18/2024  Surgeon:  iVktoria Mueller MD    Assessment/Plan   Basal cell carcinoma (BCC) of left upper extremity  Left Upper Arm - Anterior    Destr of lesion  Complexity: simple    Destruction method: electrodesiccation and curettage    Informed consent: discussed and consent obtained    Timeout:  patient name, date of birth, surgical site, and procedure verified  Procedure prep:  Patient was prepped and draped  Anesthesia: the lesion was anesthetized in a standard fashion    Anesthetic:  1% lidocaine w/ epinephrine 1-100,000 local infiltration  Curettage performed in three different directions: Yes    Electrodesiccation performed over the curetted area: Yes    Curettage cycles:  3  Lesion length (cm):  1.6  Lesion width (cm):  1.6  Margin per side (cm):  0.4  Final wound size (cm):  2.4  Hemostasis achieved with:  electrodesiccation  Outcome: patient tolerated procedure well with no complications    Post-procedure details: sterile dressing applied and wound care instructions given    Dressing type: bandage and petrolatum      Neoplasm of uncertain behavior of skin    Related Procedures  Follow Up In Dermatology - Established Patient

## 2024-03-29 ENCOUNTER — CLINICAL SUPPORT (OUTPATIENT)
Dept: DERMATOLOGY | Facility: CLINIC | Age: 84
End: 2024-03-29
Payer: MEDICARE

## 2024-03-29 DIAGNOSIS — Z51.89 VISIT FOR WOUND CHECK: ICD-10-CM

## 2024-03-29 PROCEDURE — 99024 POSTOP FOLLOW-UP VISIT: CPT | Performed by: STUDENT IN AN ORGANIZED HEALTH CARE EDUCATION/TRAINING PROGRAM

## 2024-03-29 NOTE — PROGRESS NOTES
Office Follow Up Note    Visit Summary  Chief Complaint    1. Complaint Suture Removal    Dakota Manzano is a 83 y.o. male who presents for 11 days follow up after surgery for a basal cell carcinoma. The patient has no concerns today.     Location Operation site location: left upper arm - anterior    On exam,  Mr. Manzano is well-appearing and in no apparent distress. The surgical site appears clean with minimal to no erythema due to bandaging and surrounding the site due to healing. No tenderness but no sutures were present as ED&C was done at visit.     Assessment and Plan:  History of skin cancer requiring ongoing monitoring for recurrence and additional lesion development.   The patient was reassured that the wound is healing appropriately.   Site is healing well. Healthy granulation was visible.  The dressing was removed, the wound cleaned a a new dressing reapplied.     The patient was advised on the importance of routine skin monitoring including follow up with general dermatology and instructed to call with any further concerns. The patient will return in 6 months for FBSE.    1. Visit for wound check  Left Upper Arm - Anterior  Well healing site with minimal to no erythema

## 2024-04-01 ENCOUNTER — APPOINTMENT (OUTPATIENT)
Dept: DERMATOLOGY | Facility: CLINIC | Age: 84
End: 2024-04-01
Payer: MEDICARE

## 2024-05-08 DIAGNOSIS — F02.80 MIXED DEMENTIA (MULTI): ICD-10-CM

## 2024-05-08 DIAGNOSIS — G30.9 MIXED DEMENTIA (MULTI): ICD-10-CM

## 2024-05-08 DIAGNOSIS — F01.50 MIXED DEMENTIA (MULTI): ICD-10-CM

## 2024-05-08 NOTE — TELEPHONE ENCOUNTER
Wife left  on refill line asking for Donepezil 5mg to be sent to the [facility]. States the 10mg is too high of a dose and is having BP problems.

## 2024-05-09 RX ORDER — DONEPEZIL HYDROCHLORIDE 5 MG/1
TABLET, FILM COATED ORAL
Qty: 90 TABLET | Refills: 2 | Status: SHIPPED | OUTPATIENT
Start: 2024-05-09

## 2024-05-23 ENCOUNTER — OFFICE VISIT (OUTPATIENT)
Dept: CARDIOLOGY | Facility: CLINIC | Age: 84
End: 2024-05-23
Payer: MEDICARE

## 2024-05-23 VITALS
WEIGHT: 214 LBS | HEIGHT: 75 IN | SYSTOLIC BLOOD PRESSURE: 114 MMHG | DIASTOLIC BLOOD PRESSURE: 52 MMHG | BODY MASS INDEX: 26.61 KG/M2

## 2024-05-23 DIAGNOSIS — I95.1 ORTHOSTATIC HYPOTENSION: Primary | ICD-10-CM

## 2024-05-23 DIAGNOSIS — I10 ESSENTIAL HYPERTENSION: ICD-10-CM

## 2024-05-23 DIAGNOSIS — I48.0 PAROXYSMAL ATRIAL FIBRILLATION (MULTI): ICD-10-CM

## 2024-05-23 DIAGNOSIS — Z79.899 POLYPHARMACY: ICD-10-CM

## 2024-05-23 DIAGNOSIS — I87.2 VENOUS INSUFFICIENCY: ICD-10-CM

## 2024-05-23 PROCEDURE — 99214 OFFICE O/P EST MOD 30 MIN: CPT | Performed by: INTERNAL MEDICINE

## 2024-05-23 PROCEDURE — 1160F RVW MEDS BY RX/DR IN RCRD: CPT | Performed by: INTERNAL MEDICINE

## 2024-05-23 PROCEDURE — 3074F SYST BP LT 130 MM HG: CPT | Performed by: INTERNAL MEDICINE

## 2024-05-23 PROCEDURE — 3078F DIAST BP <80 MM HG: CPT | Performed by: INTERNAL MEDICINE

## 2024-05-23 PROCEDURE — 1036F TOBACCO NON-USER: CPT | Performed by: INTERNAL MEDICINE

## 2024-05-23 PROCEDURE — 1159F MED LIST DOCD IN RCRD: CPT | Performed by: INTERNAL MEDICINE

## 2024-05-23 NOTE — PATIENT INSTRUCTIONS

## 2024-05-23 NOTE — PROGRESS NOTES
"Chief Complaint:   Please see below.     History Of Present Illness:    Dakota Manzano is a 83 y.o. male presenting with PAF.    This hypertensive, diabetic, hyperlipidemic 83-year-old man has a history of paroxysmal atrial fibrillation and returns to the office in routine follow-up.  He has a reported episodes of dizziness as described in my previous notes.  His echocardiogram dated September 27, 2023 revealed an ejection fraction of 55 to 60% with no significant valvular or pericardial disease.  His monitor study disclosed low-volume paroxysmal atrial fibrillation.    He has dementia, and is not a reliable historian.  His son-in-law is with him at today's appointment and feels in some of the blanks on his story.  Since the last visit here, the patient denies chest pain, shortness of breath, palpitations, orthopnea, PND, syncope.  If he stands up quickly, he experiences dizziness.  He drinks 24 oz of water daily.      Medications are a probable culprit for his symptoms.  He is taking Trazodone, Tamsulosin, and Aricept     Last Recorded Vitals:  Vitals:    05/23/24 1300   BP: 114/52   BP Location: Left arm   Patient Position: Sitting   Weight: 97.1 kg (214 lb)   Height: 1.905 m (6' 3\")       Past Medical History:  He has a past medical history of Anesthesia of skin (01/11/2022), Basal cell carcinoma, Body mass index (BMI) 27.0-27.9, adult (06/07/2021), Disorder of the skin and subcutaneous tissue, unspecified (09/21/2020), Encounter for general adult medical examination without abnormal findings (10/27/2021), Encounter for general adult medical examination without abnormal findings (09/21/2020), Encounter for immunization (10/27/2021), Encounter for removal of sutures (04/05/2021), Encounter for screening for malignant neoplasm of prostate, Encounter for screening for malignant neoplasm of prostate, Ingrowing nail (10/18/2019), Laceration without foreign body of other part of head, initial encounter (04/05/2021), " Other conditions influencing health status, Personal history of other diseases of the nervous system and sense organs, Personal history of other drug therapy (2020), Syncope and collapse (2022), Tinea unguium (10/18/2019), and Type 2 diabetes mellitus without complications (Multi) (2019).    Past Surgical History:  He has a past surgical history that includes Other surgical history (2019); Other surgical history (2019); and Other surgical history (2019).      Social History:  He reports that he quit smoking about 62 years ago. His smoking use included cigarettes. He quit smokeless tobacco use about 62 years ago.  His smokeless tobacco use included chew. He reports that he does not currently use alcohol. He reports that he does not use drugs.    Family History:  Family History   Problem Relation Name Age of Onset    Retinal degeneration Mother      Other () Mother      Dementia Mother      Parkinsonism Mother      Other () Father      Tremor Father      Other () Brother      Heart attack Brother      Tremor Brother      Tremor Paternal Grandfather      Heart disease Other Maternal Relatives     Intellectual Disability Grandchild          Allergies:  Tetanus antitoxin and Tetanus vaccines and toxoid    Outpatient Medications:  Current Outpatient Medications   Medication Instructions    apixaban (ELIQUIS) 2.5 mg, oral, 2 times daily    ascorbic acid (VITAMIN C) 1,000 mg, oral, Daily    cholecalciferol-soy isoflavone 2,000-64 unit-mg tablet 1 Units, oral, Daily, ( Vitamin D3 )    cyanocobalamin-cobamamide (B-12 Plus) 5,000-100 mcg tablet, sublingual 1 tablet, sublingual, Daily    docusate sodium (Colace) 100 mg capsule 1 capsule 2 TIMES DAILY (route: oral)    donepezil (Aricept) 5 mg tablet 1 tab daily    fenofibrate (TRICOR) 145 mg, oral, Daily    gabapentin (Neurontin) 300 mg capsule 1 capsule at bedtime for a week, then 1 capsule BID for a week, then 1  capsule TID    levothyroxine (SYNTHROID, LEVOXYL) 88 mcg, oral, Daily    omega 3-dha-epa-fish oil (Fish OiL) 1,200 (144-216) mg capsule 1 capsule, oral, Daily    pioglitazone (ACTOS) 15 mg, oral, Daily    pravastatin (Pravachol) 10 mg tablet Take one tab daily.    tamsulosin (Flomax) 0.4 mg 24 hr capsule 1 capsule DAILY (route: oral)    traZODone (DESYREL) 50 mg, oral, Nightly       Physical Exam:  GENERAL:  pleasant 83 year-old  HEENT: No xanthelasma  NECK: Supple, no palpable adenopathy or thyromegaly  CHEST: Clear to auscultation, respiratory effort unlabored  CARDIAC: RRR, normal S1 and S2, no audible murmur, rub, gallop, carotids are brisk, PMI is not displaced  ABD: Active bowel sounds, nontender, no organomegaly, no evidence of ascites  EXT: No clubbing, cyanosis, edema, or tenderness  NEURO: Awake, alert, speech is fluent       Last Labs:  CBC -  Lab Results   Component Value Date    WBC 8.1 08/15/2023    HGB 11.8 (L) 08/15/2023    HCT 37.7 (L) 08/15/2023    MCV 85 08/15/2023     08/15/2023       CMP -  Lab Results   Component Value Date    CALCIUM 9.8 11/14/2023    PROT 7.1 11/14/2023    ALBUMIN 4.4 08/15/2023    AST 20 08/15/2023    ALT 18 08/15/2023    ALKPHOS 38 08/15/2023    BILITOT 0.5 08/15/2023       LIPID PANEL -   Lab Results   Component Value Date    CHOL 223 (H) 08/15/2023    TRIG 349 (H) 08/15/2023    HDL 30.9 (A) 08/15/2023    CHHDL 7.2 (A) 08/15/2023    LDLF 122 (H) 08/15/2023    VLDL 70 (H) 08/15/2023    NHDL 192 08/15/2023       RENAL FUNCTION PANEL -   Lab Results   Component Value Date    GLUCOSE 130 (H) 11/14/2023     11/14/2023    K 4.7 11/14/2023     11/14/2023    CO2 26 11/14/2023    ANIONGAP 14 11/14/2023    BUN 26 (H) 11/14/2023    CREATININE 1.53 (H) 11/14/2023    GFRMALE 55 (A) 08/15/2023    CALCIUM 9.8 11/14/2023    ALBUMIN 4.4 08/15/2023        Lab Results   Component Value Date    HGBA1C 6.4 (H) 11/14/2023         No recent results to review    Assessment/Plan    Problem List Items Addressed This Visit          Cardiac and Vasculature    Essential hypertension    Venous insufficiency    Paroxysmal atrial fibrillation (Multi)    Relevant Orders    Follow Up In Cardiology    Orthostatic hypotension - Primary    Relevant Orders    Follow Up In Cardiology     Other Visit Diagnoses       Polypharmacy            1.  Paroxysmal atrial fibrillation: Continue with present management.  He is tolerating medications well without side effects.    2.  Orthostatic hypotension: Likely secondary to problem #3.  -Advised the patient and his son-in-law to review medications with his primary care physician.    3.  Polypharmacy: He is on trazodone, tamsulosin, and Aricept, all of which can cause dizziness, and contribute to orthostatic hypotension.  -Advised the patient and his son-in-law to review medications with his primary care physician.      Zheng Nieto MD

## 2024-06-04 DIAGNOSIS — E11.9 TYPE 2 DIABETES MELLITUS WITHOUT COMPLICATION, WITHOUT LONG-TERM CURRENT USE OF INSULIN (MULTI): ICD-10-CM

## 2024-06-05 RX ORDER — PIOGLITAZONEHYDROCHLORIDE 15 MG/1
15 TABLET ORAL DAILY
Qty: 90 TABLET | Refills: 0 | Status: SHIPPED | OUTPATIENT
Start: 2024-06-05 | End: 2024-06-10

## 2024-06-05 NOTE — TELEPHONE ENCOUNTER
FAST FELThart message sent to the patient to schedule an appointment for refills and to request a short supply if needed  Please refuse med  Thanks

## 2024-06-10 DIAGNOSIS — E11.9 TYPE 2 DIABETES MELLITUS WITHOUT COMPLICATION, WITHOUT LONG-TERM CURRENT USE OF INSULIN (MULTI): ICD-10-CM

## 2024-06-10 DIAGNOSIS — E03.9 HYPOTHYROIDISM, UNSPECIFIED TYPE: ICD-10-CM

## 2024-06-10 RX ORDER — PIOGLITAZONEHYDROCHLORIDE 15 MG/1
15 TABLET ORAL DAILY
Qty: 90 TABLET | Refills: 0 | Status: SHIPPED | OUTPATIENT
Start: 2024-06-10

## 2024-06-10 RX ORDER — LEVOTHYROXINE SODIUM 88 UG/1
88 TABLET ORAL DAILY
Qty: 90 TABLET | Refills: 0 | Status: SHIPPED | OUTPATIENT
Start: 2024-06-10

## 2024-06-10 NOTE — TELEPHONE ENCOUNTER
Last seen 11-14-23. Patient needs an appointment. Please call to schedule, and let us know if a short supply is needed. Thank you!

## 2024-07-02 ENCOUNTER — APPOINTMENT (OUTPATIENT)
Dept: NEUROLOGY | Facility: CLINIC | Age: 84
End: 2024-07-02
Payer: MEDICARE

## 2024-08-02 DIAGNOSIS — F51.01 PRIMARY INSOMNIA: ICD-10-CM

## 2024-08-05 RX ORDER — TRAZODONE HYDROCHLORIDE 50 MG/1
50 TABLET ORAL NIGHTLY
Qty: 30 TABLET | Refills: 0 | Status: SHIPPED | OUTPATIENT
Start: 2024-08-05

## 2024-08-21 ENCOUNTER — APPOINTMENT (OUTPATIENT)
Dept: PRIMARY CARE | Facility: CLINIC | Age: 84
End: 2024-08-21
Payer: MEDICARE

## 2024-09-24 ENCOUNTER — APPOINTMENT (OUTPATIENT)
Dept: PRIMARY CARE | Facility: CLINIC | Age: 84
End: 2024-09-24
Payer: MEDICARE

## 2024-09-24 VITALS
OXYGEN SATURATION: 98 % | TEMPERATURE: 97.8 F | WEIGHT: 215.6 LBS | RESPIRATION RATE: 16 BRPM | SYSTOLIC BLOOD PRESSURE: 110 MMHG | DIASTOLIC BLOOD PRESSURE: 64 MMHG | HEART RATE: 58 BPM | BODY MASS INDEX: 26.81 KG/M2 | HEIGHT: 75 IN

## 2024-09-24 DIAGNOSIS — E11.22 TYPE 2 DIABETES MELLITUS WITH DIABETIC CHRONIC KIDNEY DISEASE, UNSPECIFIED CKD STAGE, UNSPECIFIED WHETHER LONG TERM INSULIN USE: ICD-10-CM

## 2024-09-24 DIAGNOSIS — F01.50 MIXED DEMENTIA (MULTI): ICD-10-CM

## 2024-09-24 DIAGNOSIS — Z00.00 MEDICARE ANNUAL WELLNESS VISIT, SUBSEQUENT: Primary | ICD-10-CM

## 2024-09-24 DIAGNOSIS — E03.4 HYPOTHYROIDISM DUE TO ACQUIRED ATROPHY OF THYROID: ICD-10-CM

## 2024-09-24 DIAGNOSIS — I10 ESSENTIAL HYPERTENSION: ICD-10-CM

## 2024-09-24 DIAGNOSIS — Z23 NEED FOR INFLUENZA VACCINATION: ICD-10-CM

## 2024-09-24 DIAGNOSIS — F51.01 PRIMARY INSOMNIA: ICD-10-CM

## 2024-09-24 DIAGNOSIS — G30.9 MIXED DEMENTIA (MULTI): ICD-10-CM

## 2024-09-24 DIAGNOSIS — E03.9 HYPOTHYROIDISM, UNSPECIFIED TYPE: ICD-10-CM

## 2024-09-24 DIAGNOSIS — N40.0 BENIGN PROSTATIC HYPERPLASIA WITHOUT LOWER URINARY TRACT SYMPTOMS: ICD-10-CM

## 2024-09-24 DIAGNOSIS — M81.0 OSTEOPOROSIS, UNSPECIFIED OSTEOPOROSIS TYPE, UNSPECIFIED PATHOLOGICAL FRACTURE PRESENCE: ICD-10-CM

## 2024-09-24 DIAGNOSIS — F02.80 MIXED DEMENTIA (MULTI): ICD-10-CM

## 2024-09-24 DIAGNOSIS — I48.0 PAROXYSMAL ATRIAL FIBRILLATION (MULTI): ICD-10-CM

## 2024-09-24 DIAGNOSIS — N18.30 CHRONIC KIDNEY DISEASE (CKD), ACTIVE MEDICAL MANAGEMENT WITHOUT DIALYSIS, STAGE 3 (MODERATE) (MULTI): ICD-10-CM

## 2024-09-24 DIAGNOSIS — E78.2 MIXED HYPERLIPIDEMIA: ICD-10-CM

## 2024-09-24 DIAGNOSIS — G20.C PARKINSONISM, UNSPECIFIED PARKINSONISM TYPE (MULTI): ICD-10-CM

## 2024-09-24 DIAGNOSIS — E11.9 TYPE 2 DIABETES MELLITUS WITHOUT COMPLICATION, WITHOUT LONG-TERM CURRENT USE OF INSULIN (MULTI): ICD-10-CM

## 2024-09-24 DIAGNOSIS — M79.89 LEG SWELLING: ICD-10-CM

## 2024-09-24 PROCEDURE — 90662 IIV NO PRSV INCREASED AG IM: CPT | Performed by: FAMILY MEDICINE

## 2024-09-24 PROCEDURE — 1160F RVW MEDS BY RX/DR IN RCRD: CPT | Performed by: FAMILY MEDICINE

## 2024-09-24 PROCEDURE — 99213 OFFICE O/P EST LOW 20 MIN: CPT | Performed by: FAMILY MEDICINE

## 2024-09-24 PROCEDURE — 3078F DIAST BP <80 MM HG: CPT | Performed by: FAMILY MEDICINE

## 2024-09-24 PROCEDURE — 3074F SYST BP LT 130 MM HG: CPT | Performed by: FAMILY MEDICINE

## 2024-09-24 PROCEDURE — G0439 PPPS, SUBSEQ VISIT: HCPCS | Performed by: FAMILY MEDICINE

## 2024-09-24 PROCEDURE — 1158F ADVNC CARE PLAN TLK DOCD: CPT | Performed by: FAMILY MEDICINE

## 2024-09-24 PROCEDURE — 1123F ACP DISCUSS/DSCN MKR DOCD: CPT | Performed by: FAMILY MEDICINE

## 2024-09-24 PROCEDURE — 1170F FXNL STATUS ASSESSED: CPT | Performed by: FAMILY MEDICINE

## 2024-09-24 PROCEDURE — G0008 ADMIN INFLUENZA VIRUS VAC: HCPCS | Performed by: FAMILY MEDICINE

## 2024-09-24 PROCEDURE — 1159F MED LIST DOCD IN RCRD: CPT | Performed by: FAMILY MEDICINE

## 2024-09-24 PROCEDURE — 1036F TOBACCO NON-USER: CPT | Performed by: FAMILY MEDICINE

## 2024-09-24 RX ORDER — PIOGLITAZONEHYDROCHLORIDE 15 MG/1
15 TABLET ORAL DAILY
Qty: 90 TABLET | Refills: 1 | Status: SHIPPED | OUTPATIENT
Start: 2024-09-24

## 2024-09-24 RX ORDER — HYDROCHLOROTHIAZIDE 12.5 MG/1
12.5 TABLET ORAL DAILY
Qty: 90 TABLET | Refills: 1 | Status: SHIPPED | OUTPATIENT
Start: 2024-09-24 | End: 2024-09-24 | Stop reason: SDUPTHER

## 2024-09-24 RX ORDER — LEVOTHYROXINE SODIUM 88 UG/1
88 TABLET ORAL DAILY
Qty: 30 TABLET | Refills: 1 | Status: SHIPPED | OUTPATIENT
Start: 2024-09-24

## 2024-09-24 RX ORDER — PRAVASTATIN SODIUM 10 MG/1
TABLET ORAL
Qty: 90 TABLET | Refills: 1 | Status: SHIPPED | OUTPATIENT
Start: 2024-09-24

## 2024-09-24 RX ORDER — TAMSULOSIN HYDROCHLORIDE 0.4 MG/1
CAPSULE ORAL
Qty: 90 CAPSULE | Refills: 1 | Status: SHIPPED | OUTPATIENT
Start: 2024-09-24

## 2024-09-24 RX ORDER — DONEPEZIL HYDROCHLORIDE 10 MG/1
TABLET, FILM COATED ORAL
Qty: 90 TABLET | Refills: 1 | Status: SHIPPED | OUTPATIENT
Start: 2024-09-24

## 2024-09-24 RX ORDER — HYDROCHLOROTHIAZIDE 12.5 MG/1
12.5 TABLET ORAL DAILY
Qty: 30 TABLET | Refills: 1 | Status: SHIPPED | OUTPATIENT
Start: 2024-09-24 | End: 2025-03-23

## 2024-09-24 RX ORDER — TRAZODONE HYDROCHLORIDE 50 MG/1
50 TABLET ORAL NIGHTLY
Qty: 90 TABLET | Refills: 1 | Status: SHIPPED | OUTPATIENT
Start: 2024-09-24

## 2024-09-24 RX ORDER — LEVOTHYROXINE SODIUM 88 UG/1
88 TABLET ORAL DAILY
Qty: 90 TABLET | Refills: 1 | Status: SHIPPED | OUTPATIENT
Start: 2024-09-24 | End: 2024-09-24 | Stop reason: SDUPTHER

## 2024-09-24 ASSESSMENT — ENCOUNTER SYMPTOMS
DEPRESSION: 0
OCCASIONAL FEELINGS OF UNSTEADINESS: 0
LOSS OF SENSATION IN FEET: 0

## 2024-09-24 ASSESSMENT — ACTIVITIES OF DAILY LIVING (ADL)
TAKING_MEDICATION: INDEPENDENT
DRESSING: INDEPENDENT
MANAGING_FINANCES: INDEPENDENT
GROCERY_SHOPPING: INDEPENDENT
DOING_HOUSEWORK: INDEPENDENT
BATHING: INDEPENDENT

## 2024-09-24 ASSESSMENT — PATIENT HEALTH QUESTIONNAIRE - PHQ9
1. LITTLE INTEREST OR PLEASURE IN DOING THINGS: NOT AT ALL
2. FEELING DOWN, DEPRESSED OR HOPELESS: NOT AT ALL
SUM OF ALL RESPONSES TO PHQ9 QUESTIONS 1 AND 2: 0

## 2024-09-24 NOTE — PROGRESS NOTES
"Subjective   Patient ID: Dakota Manzano is a 84 y.o. male who presents for Hypertension, Hyperlipidemia, Diabetes, and Medicare Annual Wellness Visit Subsequent.  HPI  Medicare Annual Wellness    Patient presents in office today for HTN, HLD, and DM follow up. Tries to follow a low sugar, low sodium, low fat diet. Stays active. Does not check sugars/BP at home. Denies any side effects from medications.      Daughter states that patient has been having swelling in ankles and feet which is worse than it has ever been.    Patient had a fall about a month ago in the basement no injury.     Daughter would also like to discuss dementia medications. She would also like to go back to pill packs for medications due to med shortages.    Taking current medications which were reviewed.  Problem list discussed.    Eating okay.  Staying active.    Has no other new problem /question.     ROS  Constitutional- No activity change. No appetite change.  Eyes- Denies vision changes.  Respiratory- No shortness of breath.  Cardiovascular- No palpitations. No chest pain.  GI- No nausea or vomiting. No diarrhea or constipation. Denies abdominal pain.  Musculoskeletal- Denies joint swelling.  Neurological- Denies headaches. Denies dizziness.  Skin- No rashes.  Psychiatric/Behavioral- Denies significant anxiety, or depressed mood.     Objective     /64   Pulse 58   Temp 36.6 °C (97.8 °F)   Resp 16   Ht 1.905 m (6' 3\")   Wt 97.8 kg (215 lb 9.6 oz)   SpO2 98%   BMI 26.95 kg/m²     Allergies   Allergen Reactions    Tetanus Antitoxin Unknown    Tetanus Vaccines And Toxoid Unknown       Constitutional-- Well-nourished.  No distress  Head- unremarkable.  Eyes- PERRL.  Conjunctiva normal.  Nose- Normal.  No rhinorrhea noted.  Throat- Oropharynx is clear and moist.  Neck- Supple with no thyromegaly.  No significant cervical adenopathy noted.  Pulmonary/Chest- Breath sounds normal with normal effort.  No wheezing.  Heart- Regular rate " and rhythm.  No murmur.  Abdomen- Soft and non-tender.  No masses noted.  Musculoskeletal- Normal ROM.  No significant joint swelling  Extremities-1+ leg swelling to above the ankles  Neurological- Alert.  Mild memory issues.  Was able to recall the president's and what he had for supper last night.  Was able to do simple math in his head  Skin- Warm.  No rashes.  Psychiatric/Behavioral- Mood and affect normal.  Behavior normal.     Assessment/Plan   1. Medicare annual wellness visit, subsequent        2. Essential hypertension  CBC and Auto Differential    Comprehensive metabolic panel    hydroCHLOROthiazide (Microzide) 12.5 mg tablet    DISCONTINUED: hydroCHLOROthiazide (Microzide) 12.5 mg tablet      3. Type 2 diabetes mellitus with diabetic chronic kidney disease, unspecified CKD stage, unspecified whether long term insulin use (Multi)  Comprehensive metabolic panel    Hemoglobin A1c      4. Mixed hyperlipidemia  Lipid panel    pravastatin (Pravachol) 10 mg tablet      5. Hypothyroidism due to acquired atrophy of thyroid  Tsh With Reflex To Free T4 If Abnormal      6. Osteoporosis, unspecified osteoporosis type, unspecified pathological fracture presence        7. Need for influenza vaccination  Flu vaccine, trivalent, preservative free, HIGH-DOSE, age 65y+ (Fluzone)      8. Hypothyroidism, unspecified type  levothyroxine (Synthroid, Levoxyl) 88 mcg tablet    DISCONTINUED: levothyroxine (Synthroid, Levoxyl) 88 mcg tablet      9. Mixed dementia (Multi)  donepezil (Aricept) 10 mg tablet      10. Type 2 diabetes mellitus without complication, without long-term current use of insulin (Multi)  pioglitazone (Actos) 15 mg tablet      11. Benign prostatic hyperplasia without lower urinary tract symptoms  tamsulosin (Flomax) 0.4 mg 24 hr capsule      12. Parkinsonism, unspecified Parkinsonism type (Multi)        13. Chronic kidney disease (CKD), active medical management without dialysis, stage 3 (moderate) (Multi)         14. Primary insomnia  traZODone (Desyrel) 50 mg tablet      15. Paroxysmal atrial fibrillation (Multi)  apixaban (Eliquis) 2.5 mg tablet      16. Leg swelling           Medicare wellness questionnaire reviewed in detail. Advanced Directives reviewed today. Patient advised to provide the office with a copy if has not already done so. No problems with activities of daily living.  Falls risks reviewed.     Long talk with patient and his daughter. Treatment options reviewed.    Reviewed most recent lab work with patient. Advised patient to remain up to date on routine maintenance and health screening.  Maintain appointments with specialists as scheduled.  Advised patient to remain up to date on immunizations.     Discussed hypertension.  Without hydrochlorothiazide.  This will help with his leg swelling as well.    Discussed Diabetes Mellitus. Continue to monitor glucose levels. Educated on diabetes, low-calorie diet and exercise. Recommended eye exam once a year. Educated on diabetic foot care.    Continue current medication. Educated on insomnia and sleep hygiene.    Discussed Chronic kidney disease. Will continue to monitor renal function.       Dementia will continue to follow-up with neurology.    Discussed importance of natural sources of nutrition.  Advised patient to consume vegetables, salads, fruits, nuts, and proteins such as fish and chicken.  Discussed portion control.    Discussed the importance of routine stretching and exercise.     Complete blood work as discussed. Advised patient to remain properly hydrated.     Continue and take your medications as prescribed.    Health Maintenance issues discussed.    Importance of healthy diet and regular exercise regimen discussed.    We will contact you with any test results ordered. If you do not hear from us, please contact.    Follow-up as instructed or sooner if any problems or symptoms do not resolve as expected.         Scribe Attestation  By signing my  name below, ICorina Scribe   attest that this documentation has been prepared under the direction and in the presence of Matty Norton MD.

## 2024-09-25 ENCOUNTER — LAB (OUTPATIENT)
Dept: LAB | Facility: LAB | Age: 84
End: 2024-09-25
Payer: MEDICARE

## 2024-09-25 DIAGNOSIS — E03.4 HYPOTHYROIDISM DUE TO ACQUIRED ATROPHY OF THYROID: ICD-10-CM

## 2024-09-25 DIAGNOSIS — E11.22 TYPE 2 DIABETES MELLITUS WITH DIABETIC CHRONIC KIDNEY DISEASE, UNSPECIFIED CKD STAGE, UNSPECIFIED WHETHER LONG TERM INSULIN USE: ICD-10-CM

## 2024-09-25 DIAGNOSIS — E78.2 MIXED HYPERLIPIDEMIA: ICD-10-CM

## 2024-09-25 DIAGNOSIS — I10 ESSENTIAL HYPERTENSION: ICD-10-CM

## 2024-09-25 LAB
ALBUMIN SERPL BCP-MCNC: 4.3 G/DL (ref 3.4–5)
ALP SERPL-CCNC: 71 U/L (ref 33–136)
ALT SERPL W P-5'-P-CCNC: 23 U/L (ref 10–52)
ANION GAP SERPL CALC-SCNC: 12 MMOL/L (ref 10–20)
AST SERPL W P-5'-P-CCNC: 27 U/L (ref 9–39)
BASOPHILS # BLD AUTO: 0.05 X10*3/UL (ref 0–0.1)
BASOPHILS NFR BLD AUTO: 0.7 %
BILIRUB SERPL-MCNC: 0.9 MG/DL (ref 0–1.2)
BUN SERPL-MCNC: 25 MG/DL (ref 6–23)
CALCIUM SERPL-MCNC: 9.6 MG/DL (ref 8.6–10.3)
CHLORIDE SERPL-SCNC: 101 MMOL/L (ref 98–107)
CHOLEST SERPL-MCNC: 199 MG/DL (ref 0–199)
CHOLESTEROL/HDL RATIO: 6.1
CO2 SERPL-SCNC: 29 MMOL/L (ref 21–32)
CREAT SERPL-MCNC: 1.03 MG/DL (ref 0.5–1.3)
EGFRCR SERPLBLD CKD-EPI 2021: 72 ML/MIN/1.73M*2
EOSINOPHIL # BLD AUTO: 0.22 X10*3/UL (ref 0–0.4)
EOSINOPHIL NFR BLD AUTO: 3.3 %
ERYTHROCYTE [DISTWIDTH] IN BLOOD BY AUTOMATED COUNT: 16.5 % (ref 11.5–14.5)
EST. AVERAGE GLUCOSE BLD GHB EST-MCNC: 151 MG/DL
GLUCOSE SERPL-MCNC: 135 MG/DL (ref 74–99)
HBA1C MFR BLD: 6.9 %
HCT VFR BLD AUTO: 39.7 % (ref 41–52)
HDLC SERPL-MCNC: 32.6 MG/DL
HGB BLD-MCNC: 12.1 G/DL (ref 13.5–17.5)
IMM GRANULOCYTES # BLD AUTO: 0.03 X10*3/UL (ref 0–0.5)
IMM GRANULOCYTES NFR BLD AUTO: 0.4 % (ref 0–0.9)
LDLC SERPL CALC-MCNC: 117 MG/DL
LYMPHOCYTES # BLD AUTO: 2.04 X10*3/UL (ref 0.8–3)
LYMPHOCYTES NFR BLD AUTO: 30.6 %
MCH RBC QN AUTO: 24.6 PG (ref 26–34)
MCHC RBC AUTO-ENTMCNC: 30.5 G/DL (ref 32–36)
MCV RBC AUTO: 81 FL (ref 80–100)
MONOCYTES # BLD AUTO: 0.6 X10*3/UL (ref 0.05–0.8)
MONOCYTES NFR BLD AUTO: 9 %
NEUTROPHILS # BLD AUTO: 3.73 X10*3/UL (ref 1.6–5.5)
NEUTROPHILS NFR BLD AUTO: 56 %
NON HDL CHOLESTEROL: 166 MG/DL (ref 0–149)
NRBC BLD-RTO: 0 /100 WBCS (ref 0–0)
PLATELET # BLD AUTO: 199 X10*3/UL (ref 150–450)
POTASSIUM SERPL-SCNC: 4.3 MMOL/L (ref 3.5–5.3)
PROT SERPL-MCNC: 6.9 G/DL (ref 6.4–8.2)
RBC # BLD AUTO: 4.92 X10*6/UL (ref 4.5–5.9)
SODIUM SERPL-SCNC: 138 MMOL/L (ref 136–145)
T4 FREE SERPL-MCNC: 0.82 NG/DL (ref 0.61–1.12)
TRIGL SERPL-MCNC: 247 MG/DL (ref 0–149)
TSH SERPL-ACNC: 10.68 MIU/L (ref 0.44–3.98)
VLDL: 49 MG/DL (ref 0–40)
WBC # BLD AUTO: 6.7 X10*3/UL (ref 4.4–11.3)

## 2024-09-25 PROCEDURE — 84439 ASSAY OF FREE THYROXINE: CPT

## 2024-09-25 PROCEDURE — 36415 COLL VENOUS BLD VENIPUNCTURE: CPT

## 2024-09-25 PROCEDURE — 80061 LIPID PANEL: CPT

## 2024-09-25 PROCEDURE — 85025 COMPLETE CBC W/AUTO DIFF WBC: CPT

## 2024-09-25 PROCEDURE — 83036 HEMOGLOBIN GLYCOSYLATED A1C: CPT

## 2024-09-25 PROCEDURE — 84443 ASSAY THYROID STIM HORMONE: CPT

## 2024-09-25 PROCEDURE — 80053 COMPREHEN METABOLIC PANEL: CPT

## 2024-10-01 ENCOUNTER — TELEPHONE (OUTPATIENT)
Dept: PRIMARY CARE | Facility: CLINIC | Age: 84
End: 2024-10-01
Payer: MEDICARE

## 2024-10-01 DIAGNOSIS — F02.80 MIXED DEMENTIA (MULTI): ICD-10-CM

## 2024-10-01 DIAGNOSIS — E11.9 TYPE 2 DIABETES MELLITUS WITHOUT COMPLICATION, WITHOUT LONG-TERM CURRENT USE OF INSULIN (MULTI): ICD-10-CM

## 2024-10-01 DIAGNOSIS — E03.4 HYPOTHYROIDISM DUE TO ACQUIRED ATROPHY OF THYROID: ICD-10-CM

## 2024-10-01 DIAGNOSIS — G30.9 MIXED DEMENTIA (MULTI): ICD-10-CM

## 2024-10-01 DIAGNOSIS — F51.01 PRIMARY INSOMNIA: ICD-10-CM

## 2024-10-01 DIAGNOSIS — F01.50 MIXED DEMENTIA (MULTI): ICD-10-CM

## 2024-10-01 RX ORDER — PIOGLITAZONEHYDROCHLORIDE 15 MG/1
15 TABLET ORAL DAILY
Qty: 14 TABLET | Refills: 0 | Status: SHIPPED | OUTPATIENT
Start: 2024-10-01

## 2024-10-01 RX ORDER — TRAZODONE HYDROCHLORIDE 50 MG/1
50 TABLET ORAL NIGHTLY
Qty: 14 TABLET | Refills: 0 | Status: SHIPPED | OUTPATIENT
Start: 2024-10-01

## 2024-10-01 RX ORDER — LEVOTHYROXINE SODIUM 100 UG/1
100 TABLET ORAL DAILY
Qty: 30 TABLET | Refills: 3 | Status: SHIPPED | OUTPATIENT
Start: 2024-10-01 | End: 2025-01-29

## 2024-10-01 RX ORDER — DONEPEZIL HYDROCHLORIDE 10 MG/1
TABLET, FILM COATED ORAL
Qty: 14 TABLET | Refills: 0 | Status: SHIPPED | OUTPATIENT
Start: 2024-10-01

## 2024-10-01 NOTE — TELEPHONE ENCOUNTER
VONNIE AWARE. SHE IS NOT SURE WHEN THE PRESCRITPIONS WILL BE IN SO SHE IS REQUESTING A 2 WEEK SUPPL[facility] TO [facility] PATIENT [facility]. PATIENT IS ALMOST OUT OF MEDICATIONS.

## 2024-10-01 NOTE — TELEPHONE ENCOUNTER
VONNIE DAUGHTER WOULD LIKE TO KNOW IF SHE CAN FINISH WHAT SHE HAS SINCE PATIENT IS GOING TO THE PILL PACK THAT SHOULD BE HERE WITHIN THE NEXT WEEK. PLEASE ADVISE. MEDICATION PENDED FOR PILL PACK FOR THE NEXT REFILLS. LAB ORDERED

## 2024-10-01 NOTE — TELEPHONE ENCOUNTER
Matty Norton MD  [id number] [place] [id number] Clinical Support Staff2 minutes ago (12:30 PM)       Okay to finish what he has and then switch to the new thyroid medicine when the next pill pack shipment comes in.  Repeat labs are ordered for him to get done after he is on the new dose of thyroid medicine for about a month.  Please let his daughter know

## 2024-10-01 NOTE — TELEPHONE ENCOUNTER
----- Message from Matty Norton sent at 10/1/2024  8:41 AM EDT -----  Labs are within normal limits or stable except your thyroid level is a little low.  Increase levothyroxine to 100 mcg daily 30 pills with 3 refills.  Arrange for repeat TSH and free T4 level in 1 month.  Please let him know and arrange

## 2024-10-25 DIAGNOSIS — I10 ESSENTIAL HYPERTENSION: ICD-10-CM

## 2024-10-25 DIAGNOSIS — G30.9 MIXED DEMENTIA (MULTI): ICD-10-CM

## 2024-10-25 DIAGNOSIS — F02.80 MIXED DEMENTIA (MULTI): ICD-10-CM

## 2024-10-25 DIAGNOSIS — F01.50 MIXED DEMENTIA (MULTI): ICD-10-CM

## 2024-10-25 DIAGNOSIS — F51.01 PRIMARY INSOMNIA: ICD-10-CM

## 2024-10-25 DIAGNOSIS — E03.4 HYPOTHYROIDISM DUE TO ACQUIRED ATROPHY OF THYROID: ICD-10-CM

## 2024-10-25 RX ORDER — TRAZODONE HYDROCHLORIDE 50 MG/1
50 TABLET ORAL NIGHTLY
Qty: 30 TABLET | Refills: 0 | Status: SHIPPED | OUTPATIENT
Start: 2024-10-25

## 2024-10-25 RX ORDER — DONEPEZIL HYDROCHLORIDE 10 MG/1
TABLET, FILM COATED ORAL
Qty: 30 TABLET | Refills: 0 | Status: SHIPPED | OUTPATIENT
Start: 2024-10-25

## 2024-10-25 RX ORDER — LEVOTHYROXINE SODIUM 100 UG/1
100 TABLET ORAL DAILY
Qty: 30 TABLET | Refills: 3 | Status: SHIPPED | OUTPATIENT
Start: 2024-10-25 | End: 2025-02-22

## 2024-10-25 RX ORDER — HYDROCHLOROTHIAZIDE 12.5 MG/1
12.5 TABLET ORAL DAILY
Qty: 30 TABLET | Refills: 1 | Status: SHIPPED | OUTPATIENT
Start: 2024-10-25

## 2024-12-09 DIAGNOSIS — G30.9 MIXED DEMENTIA (MULTI): ICD-10-CM

## 2024-12-09 DIAGNOSIS — F51.01 PRIMARY INSOMNIA: ICD-10-CM

## 2024-12-09 DIAGNOSIS — F02.80 MIXED DEMENTIA (MULTI): ICD-10-CM

## 2024-12-09 DIAGNOSIS — E11.9 TYPE 2 DIABETES MELLITUS WITHOUT COMPLICATION, WITHOUT LONG-TERM CURRENT USE OF INSULIN (MULTI): ICD-10-CM

## 2024-12-09 DIAGNOSIS — F01.50 MIXED DEMENTIA (MULTI): ICD-10-CM

## 2024-12-09 RX ORDER — DONEPEZIL HYDROCHLORIDE 10 MG/1
TABLET, FILM COATED ORAL
Qty: 90 TABLET | Refills: 1 | Status: SHIPPED | OUTPATIENT
Start: 2024-12-09

## 2024-12-09 RX ORDER — TRAZODONE HYDROCHLORIDE 50 MG/1
50 TABLET ORAL NIGHTLY
Qty: 90 TABLET | Refills: 1 | Status: SHIPPED | OUTPATIENT
Start: 2024-12-09

## 2024-12-09 RX ORDER — PIOGLITAZONEHYDROCHLORIDE 15 MG/1
15 TABLET ORAL DAILY
Qty: 90 TABLET | Refills: 1 | Status: SHIPPED | OUTPATIENT
Start: 2024-12-09

## 2024-12-09 NOTE — TELEPHONE ENCOUNTER
Rx Refill Request Telephone Encounter    Name:  Dakota Manzano  : 1940     Medication Name:  DONEPEZIL  Dose (Optional):    10 MG  Quantity (Optional):    90 WITH REFILLS  Directions (Optional):   1 TABLET DAILY    Medication Name:  TRAZODONE  Dose (Optional):    50 MG  Quantity (Optional):    90 WITH REFILLS  Directions (Optional):   1 TABLET AT BEDTIME    Medication Name:  PIOGLITAZONE  Dose (Optional):    15 MG  Quantity (Optional):    90 WITH REFILLS  Directions (Optional):   1 TABLET DAILY         ALLERGIES:   ON FILE    Specific Pharmacy location:  [facility]    Date of last appointment:  24  Date of next appointment:  NONE    Best number to reach patient:

## 2024-12-20 ENCOUNTER — APPOINTMENT (OUTPATIENT)
Dept: PRIMARY CARE | Facility: CLINIC | Age: 84
End: 2024-12-20
Payer: MEDICARE

## 2025-01-07 ENCOUNTER — APPOINTMENT (OUTPATIENT)
Dept: PRIMARY CARE | Facility: CLINIC | Age: 85
End: 2025-01-07
Payer: MEDICARE

## 2025-02-10 DIAGNOSIS — I10 ESSENTIAL HYPERTENSION: ICD-10-CM

## 2025-02-10 RX ORDER — HYDROCHLOROTHIAZIDE 12.5 MG/1
12.5 TABLET ORAL DAILY
Qty: 30 TABLET | Refills: 0 | Status: SHIPPED | OUTPATIENT
Start: 2025-02-10

## 2025-02-14 DIAGNOSIS — N40.0 BENIGN PROSTATIC HYPERPLASIA WITHOUT LOWER URINARY TRACT SYMPTOMS: ICD-10-CM

## 2025-02-14 RX ORDER — TAMSULOSIN HYDROCHLORIDE 0.4 MG/1
CAPSULE ORAL
Qty: 90 CAPSULE | Refills: 3 | Status: SHIPPED | OUTPATIENT
Start: 2025-02-14

## 2025-03-16 DIAGNOSIS — E78.2 MIXED HYPERLIPIDEMIA: ICD-10-CM

## 2025-03-17 RX ORDER — PRAVASTATIN SODIUM 10 MG/1
TABLET ORAL
Qty: 30 TABLET | Refills: 0 | Status: SHIPPED | OUTPATIENT
Start: 2025-03-17

## 2025-03-19 DIAGNOSIS — I10 ESSENTIAL HYPERTENSION: ICD-10-CM

## 2025-03-20 RX ORDER — HYDROCHLOROTHIAZIDE 12.5 MG/1
12.5 TABLET ORAL DAILY
Qty: 30 TABLET | Refills: 0 | Status: SHIPPED | OUTPATIENT
Start: 2025-03-20

## 2025-03-28 DIAGNOSIS — E03.4 HYPOTHYROIDISM DUE TO ACQUIRED ATROPHY OF THYROID: ICD-10-CM

## 2025-03-28 RX ORDER — LEVOTHYROXINE SODIUM 100 UG/1
100 TABLET ORAL DAILY
Qty: 30 TABLET | Refills: 0 | Status: SHIPPED | OUTPATIENT
Start: 2025-03-28 | End: 2025-07-26

## 2025-03-28 NOTE — TELEPHONE ENCOUNTER
Rx Refill Request Telephone Encounter    Name:  Dakota Manzano  : 1940     levothyroxine (Synthroid, Levoxyl) 100 mcg tablet     Order Details  Dose: 100 mcg Route: oral Frequency: Daily   Dispense Quantity: 30 tablet Refills: 3          Sig: Take 1 tablet (100 mcg) by mouth early in the morning.. Take on an empty stomach at the same time each day, either 30 to 60 minutes prior to breakfast     ALLERGIES:   SEE LIST    Specific Pharmacy location:  [facility]    Date of last appointment:  2024  Date of next appointment:  NONE    Best number to reach patient:  [phone]

## 2025-04-15 DIAGNOSIS — I48.0 PAROXYSMAL ATRIAL FIBRILLATION (MULTI): ICD-10-CM

## 2025-04-15 RX ORDER — APIXABAN 2.5 MG/1
2.5 TABLET, FILM COATED ORAL 2 TIMES DAILY
Qty: 180 TABLET | Refills: 0 | Status: SHIPPED | OUTPATIENT
Start: 2025-04-15

## 2025-04-16 DIAGNOSIS — E78.2 MIXED HYPERLIPIDEMIA: ICD-10-CM

## 2025-04-16 RX ORDER — PRAVASTATIN SODIUM 10 MG/1
10 TABLET ORAL DAILY
Qty: 30 TABLET | Refills: 0 | Status: SHIPPED | OUTPATIENT
Start: 2025-04-16

## 2025-04-21 ENCOUNTER — TELEPHONE (OUTPATIENT)
Facility: HOSPITAL | Age: 85
End: 2025-04-21
Payer: MEDICARE

## 2025-04-21 NOTE — PROGRESS NOTES
Called patient regarding over due care cap; diabetic eye exam.      Left patient a brief message with call back number listed below.      Bildero message sent.      Salome Celis  Patient Navigator II  [address] [place] [address]  (P) [phone]  (F) [phone]

## 2025-04-23 ENCOUNTER — APPOINTMENT (OUTPATIENT)
Dept: PRIMARY CARE | Facility: CLINIC | Age: 85
End: 2025-04-23
Payer: MEDICARE

## 2025-04-23 VITALS
HEIGHT: 75 IN | BODY MASS INDEX: 26.73 KG/M2 | DIASTOLIC BLOOD PRESSURE: 60 MMHG | SYSTOLIC BLOOD PRESSURE: 120 MMHG | WEIGHT: 215 LBS | RESPIRATION RATE: 18 BRPM

## 2025-04-23 DIAGNOSIS — I10 ESSENTIAL HYPERTENSION: ICD-10-CM

## 2025-04-23 DIAGNOSIS — G89.29 CHRONIC LEFT-SIDED LOW BACK PAIN WITHOUT SCIATICA: ICD-10-CM

## 2025-04-23 DIAGNOSIS — N18.31 STAGE 3A CHRONIC KIDNEY DISEASE (MULTI): ICD-10-CM

## 2025-04-23 DIAGNOSIS — E11.9 TYPE 2 DIABETES MELLITUS WITHOUT COMPLICATION, WITHOUT LONG-TERM CURRENT USE OF INSULIN: Primary | ICD-10-CM

## 2025-04-23 DIAGNOSIS — N18.30 CHRONIC KIDNEY DISEASE (CKD), ACTIVE MEDICAL MANAGEMENT WITHOUT DIALYSIS, STAGE 3 (MODERATE) (MULTI): ICD-10-CM

## 2025-04-23 DIAGNOSIS — N18.1 CKD STAGE 1 DUE TO TYPE 2 DIABETES MELLITUS (MULTI): ICD-10-CM

## 2025-04-23 DIAGNOSIS — E03.4 HYPOTHYROIDISM DUE TO ACQUIRED ATROPHY OF THYROID: ICD-10-CM

## 2025-04-23 DIAGNOSIS — M54.50 CHRONIC LEFT-SIDED LOW BACK PAIN WITHOUT SCIATICA: ICD-10-CM

## 2025-04-23 DIAGNOSIS — E78.2 MIXED HYPERLIPIDEMIA: ICD-10-CM

## 2025-04-23 DIAGNOSIS — G30.9 MIXED DEMENTIA (MULTI): ICD-10-CM

## 2025-04-23 DIAGNOSIS — F02.80 MIXED DEMENTIA (MULTI): ICD-10-CM

## 2025-04-23 DIAGNOSIS — F51.01 PRIMARY INSOMNIA: ICD-10-CM

## 2025-04-23 DIAGNOSIS — E11.22 CKD STAGE 1 DUE TO TYPE 2 DIABETES MELLITUS (MULTI): ICD-10-CM

## 2025-04-23 DIAGNOSIS — N40.0 BENIGN PROSTATIC HYPERPLASIA WITHOUT LOWER URINARY TRACT SYMPTOMS: ICD-10-CM

## 2025-04-23 DIAGNOSIS — G20.C PARKINSONISM, UNSPECIFIED PARKINSONISM TYPE (MULTI): ICD-10-CM

## 2025-04-23 DIAGNOSIS — M15.9 OSTEOARTHRITIS OF MULTIPLE JOINTS, UNSPECIFIED OSTEOARTHRITIS TYPE: ICD-10-CM

## 2025-04-23 DIAGNOSIS — F01.50 MIXED DEMENTIA (MULTI): ICD-10-CM

## 2025-04-23 DIAGNOSIS — I48.0 PAROXYSMAL ATRIAL FIBRILLATION (MULTI): ICD-10-CM

## 2025-04-23 PROCEDURE — G2211 COMPLEX E/M VISIT ADD ON: HCPCS | Performed by: FAMILY MEDICINE

## 2025-04-23 PROCEDURE — 99214 OFFICE O/P EST MOD 30 MIN: CPT | Performed by: FAMILY MEDICINE

## 2025-04-23 PROCEDURE — 3078F DIAST BP <80 MM HG: CPT | Performed by: FAMILY MEDICINE

## 2025-04-23 PROCEDURE — 3074F SYST BP LT 130 MM HG: CPT | Performed by: FAMILY MEDICINE

## 2025-04-23 PROCEDURE — 1159F MED LIST DOCD IN RCRD: CPT | Performed by: FAMILY MEDICINE

## 2025-04-23 PROCEDURE — 1124F ACP DISCUSS-NO DSCNMKR DOCD: CPT | Performed by: FAMILY MEDICINE

## 2025-04-23 RX ORDER — TRAZODONE HYDROCHLORIDE 50 MG/1
50 TABLET ORAL NIGHTLY
Qty: 90 TABLET | Refills: 1 | Status: SHIPPED | OUTPATIENT
Start: 2025-04-23

## 2025-04-23 RX ORDER — PIOGLITAZONE 15 MG/1
15 TABLET ORAL DAILY
Qty: 90 TABLET | Refills: 1 | Status: SHIPPED | OUTPATIENT
Start: 2025-04-23

## 2025-04-23 RX ORDER — TAMSULOSIN HYDROCHLORIDE 0.4 MG/1
CAPSULE ORAL
Qty: 90 CAPSULE | Refills: 3 | Status: SHIPPED | OUTPATIENT
Start: 2025-04-23

## 2025-04-23 RX ORDER — DONEPEZIL HYDROCHLORIDE 10 MG/1
TABLET, FILM COATED ORAL
Qty: 90 TABLET | Refills: 1 | Status: SHIPPED | OUTPATIENT
Start: 2025-04-23

## 2025-04-23 RX ORDER — PRAVASTATIN SODIUM 10 MG/1
10 TABLET ORAL DAILY
Qty: 90 TABLET | Refills: 1 | Status: SHIPPED | OUTPATIENT
Start: 2025-04-23

## 2025-04-23 RX ORDER — LEVOTHYROXINE SODIUM 100 UG/1
100 TABLET ORAL DAILY
Qty: 90 TABLET | Refills: 1 | Status: SHIPPED | OUTPATIENT
Start: 2025-04-23 | End: 2025-10-20

## 2025-04-23 RX ORDER — HYDROCHLOROTHIAZIDE 12.5 MG/1
12.5 TABLET ORAL DAILY
Qty: 90 TABLET | Refills: 1 | Status: SHIPPED | OUTPATIENT
Start: 2025-04-23

## 2025-04-23 RX ORDER — METHYLPREDNISOLONE 4 MG/1
TABLET ORAL
Qty: 21 TABLET | Refills: 0 | Status: SHIPPED | OUTPATIENT
Start: 2025-04-23

## 2025-04-23 NOTE — TELEPHONE ENCOUNTER
MEDICATION PENDED  Patient's daughter, Jazmine Luna (on HIPAA) is calling because Dakota saw you this morning and one of his meds wasn't sent in. She states 2 of his meds were too early (she didn't say which ones)   Wanted to know if you could send in the hydrochlorothiazide to [facility]    Rx Refill Request Telephone Encounter    Name:  Dakota Manzano  : 1940     Medication Name:  hydrochlorothiazide   Dose (Optional):    12.5 mg  Quantity (Optional):    30  Directions (Optional):  Take one tablet by mouth every day     ALLERGIES:   see list     Specific Pharmacy location:  [facility]    Date of last appointment:  25  Date of next appointment:  10/22/25    Best number to reach patient:  [phone]

## 2025-04-23 NOTE — PROGRESS NOTES
"Subjective   Patient ID: Dakota Manzano is a 84 y.o. male who presents for Hypertension, Hyperlipidemia, and Diabetes.  HPI    Diabetic eye exam done in office today.     Patient presents in office today for HTN, HLD, and Dm follow up. Tries to follow a low sugar, low sodium, low fat diet. Stays active. Does not check sugars/BP at home. Denies any side effects from medications.      Patient presents in office today for back and hip pain. Ongoing for a while. FEW MO.  Patient does sit back in his recliner most of the day.     Taking current medications which were reviewed.  Problem list discussed.    Overall doing well.  Eating okay.  Staying active.    Has no other new problem /question.     ROS  Constitutional- No appetite change.  Eyes- Denies vision changes.  Respiratory- No shortness of breath.  Cardiovascular- No palpitations. No chest pain.  GI- No nausea or vomiting. No diarrhea or constipation. Denies abdominal pain.  Musculoskeletal- myalgias  Neurological- Denies headaches. Denies dizziness.  Skin- No rashes.  Psychiatric/Behavioral- Denies significant anxiety, or depressed mood.     Objective     /60   Resp 18   Ht 1.905 m (6' 3\")   Wt 97.5 kg (215 lb)   BMI 26.87 kg/m²     Allergies[1]    Constitutional-- Well-nourished.  No distress  Head- unremarkable.  Eyes- PERRL.  Conjunctiva normal.  Nose- Normal.  No rhinorrhea noted.  Throat- Oropharynx is clear and moist.  Neck- Supple with no thyromegaly.  No significant cervical adenopathy noted.  Pulmonary/Chest- Breath sounds normal with normal effort.  No wheezing.  Heart- Regular rate and rhythm.  No murmur.  Abdomen- Soft and non-tender.  No masses noted.  Musculoskeletal OA changes hands and knees noted.  Mild low back pain at extreme ranges of motion consistent with arthritis.  No radiculopathy.  Extremities-mild leg swelling to above the ankles chronic  Neurological- Alert.  Mild tremor consistent with his Parkinson's  Skin- Warm.  No " rashes.  Psychiatric/Behavioral- Mood and affect normal.  Behavior normal.     Assessment/Plan   1. Type 2 diabetes mellitus without complication, without long-term current use of insulin  Diabetic Retinopathy Luminetics    pioglitazone (Actos) 15 mg tablet    Hemoglobin A1C    Basic Metabolic Panel    Hemoglobin A1C    Basic Metabolic Panel      2. Hypothyroidism due to acquired atrophy of thyroid  levothyroxine (Synthroid, Levoxyl) 100 mcg tablet    Thyroid Stimulating Hormone    Thyroxine, Free    Thyroid Stimulating Hormone    Thyroxine, Free      3. Benign prostatic hyperplasia without lower urinary tract symptoms  tamsulosin (Flomax) 0.4 mg 24 hr capsule      4. Mixed hyperlipidemia  pravastatin (Pravachol) 10 mg tablet      5. Mixed dementia (Multi)  donepezil (Aricept) 10 mg tablet      6. Paroxysmal atrial fibrillation (Multi)  apixaban (Eliquis) 2.5 mg tablet      7. Primary insomnia  traZODone (Desyrel) 50 mg tablet      8. Parkinsonism, unspecified Parkinsonism type (Multi)        9. 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 Chronic kidney disease (CKD), active medical management without dialysis, stage 3 (moderate) (Multi)        11. Stage 3a chronic kidney disease (Multi)        12. Chronic left-sided low back pain without sciatica  methylPREDNISolone (Medrol Dospak) 4 mg tablets    XR lumbar spine complete 4+ views      13. Essential hypertension        14. Osteoarthritis of multiple joints, unspecified osteoarthritis type  XR lumbar spine complete 4+ views             Long talk. Treatment options reviewed.    Reviewed most recent lab work with patient. Advised patient to remain up to date on routine maintenance and health screening.  Maintain appointments with specialists as scheduled.  Advised patient to remain up to date on immunizations.     Discussed hip pain and related symptoms. Complete XRAY imaging. Discussed Osteoarthritis. Educated the patient on osteoarthritis care and  management. Educated on muscle strength and exercise. Take Methylprednisolone as discussed. Continue to monitor.     Discussed Diabetes Mellitus. Continue to monitor glucose levels. Educated on diabetes, low-calorie diet and exercise. Recommended eye exam once a year. Educated on diabetic foot care.    Parkinson's stable  Insomnia controlled  Kidney function stable.  Labs ordered  Thyroid stable    Talked about how some of his symptoms could be related to a lack of exercise.  He does not have a regular exercise routine and we talked about doing 1.  Family who is with him today agreed to help him  Discussed importance of natural sources of nutrition.  Advised patient to consume vegetables, salads, fruits, nuts, and proteins such as fish and chicken.  Discussed portion control.      Discussed the importance of routine stretching and exercise.     Complete blood work as discussed. Advised patient to remain properly hydrated.     Continue and take your medications as prescribed.    Health Maintenance issues discussed.    Importance of healthy diet and regular exercise regimen discussed.    We will contact you with any test results ordered. If you do not hear from us, please contact.    Follow-up as instructed or sooner if any problems or symptoms do not resolve as expected.    All medical record entries made by the [place] were at my direction and personally dictated by me.    I have reviewed the chart and agree that the record accurately reflects my personal performance of the history, physical exam, discussion, and plan.        Scribe Attestation  By signing my name below, I, Andrew Davis   attest that this documentation has been prepared under the direction and in the presence of Matty Norton MD.         [1]   Allergies  Allergen Reactions    Tetanus Antitoxin Unknown    Tetanus Vaccines And Toxoid Unknown

## 2025-04-30 ENCOUNTER — HOSPITAL ENCOUNTER (OUTPATIENT)
Dept: RADIOLOGY | Facility: CLINIC | Age: 85
Discharge: HOME | End: 2025-04-30
Payer: MEDICARE

## 2025-04-30 DIAGNOSIS — M15.9 OSTEOARTHRITIS OF MULTIPLE JOINTS, UNSPECIFIED OSTEOARTHRITIS TYPE: ICD-10-CM

## 2025-04-30 DIAGNOSIS — M54.50 CHRONIC LEFT-SIDED LOW BACK PAIN WITHOUT SCIATICA: ICD-10-CM

## 2025-04-30 DIAGNOSIS — G89.29 CHRONIC LEFT-SIDED LOW BACK PAIN WITHOUT SCIATICA: ICD-10-CM

## 2025-04-30 PROCEDURE — 72110 X-RAY EXAM L-2 SPINE 4/>VWS: CPT | Performed by: RADIOLOGY

## 2025-04-30 PROCEDURE — 72110 X-RAY EXAM L-2 SPINE 4/>VWS: CPT

## 2025-05-01 LAB
ANION GAP SERPL CALCULATED.4IONS-SCNC: 14 MMOL/L (CALC) (ref 7–17)
BUN SERPL-MCNC: 40 MG/DL (ref 7–25)
BUN/CREAT SERPL: 40 (CALC) (ref 6–22)
CALCIUM SERPL-MCNC: 9.9 MG/DL (ref 8.6–10.3)
CHLORIDE SERPL-SCNC: 97 MMOL/L (ref 98–110)
CO2 SERPL-SCNC: 28 MMOL/L (ref 20–32)
CREAT SERPL-MCNC: 1 MG/DL (ref 0.7–1.22)
EGFRCR SERPLBLD CKD-EPI 2021: 74 ML/MIN/1.73M2
EST. AVERAGE GLUCOSE BLD GHB EST-MCNC: 169 MG/DL
EST. AVERAGE GLUCOSE BLD GHB EST-SCNC: 9.3 MMOL/L
GLUCOSE SERPL-MCNC: 134 MG/DL (ref 65–99)
HBA1C MFR BLD: 7.5 %
POTASSIUM SERPL-SCNC: 4.1 MMOL/L (ref 3.5–5.3)
SODIUM SERPL-SCNC: 139 MMOL/L (ref 135–146)
T4 FREE SERPL-MCNC: 1.7 NG/DL (ref 0.8–1.8)
TSH SERPL-ACNC: 3.86 MIU/L (ref 0.4–4.5)

## 2025-05-02 ENCOUNTER — TELEPHONE (OUTPATIENT)
Dept: PRIMARY CARE | Facility: CLINIC | Age: 85
End: 2025-05-02
Payer: MEDICARE

## 2025-05-02 NOTE — TELEPHONE ENCOUNTER
----- Message from Matty Norton sent at 5/2/2025  7:32 AM EDT -----  X-ray of your lower back shows severe arthritis and 2 small vertebral fractures which are most likely age-related.  If current treatment and medication has not been effective then recommend follow-up   for reevaluation.  Please let him know  ----- Message -----  From: Interface, Radiology Results In  Sent: 5/1/2025   5:43 PM EDT  To: Matty Norton MD

## 2025-05-02 NOTE — TELEPHONE ENCOUNTER
----- Message from Matty Norton sent at 5/2/2025  7:37 AM EDT -----  Labs are within normal limits or stable except for elevated blood sugar and BUN which is a kidney test.  A1c is 7.5 indicating fairly good blood sugar control.  Recommend drinking a normal amount of   water daily and would also decrease your diuretic/water pill hydrochlorothiazide to only every Monday Wednesday and Friday.  Follow-up with me in 4 to 6 months.  Please let him know  ----- Message -----  From: Zachery ROXIMITY Results In  Sent: 5/1/2025   2:07 AM EDT  To: Matty Norton MD

## 2025-05-23 ENCOUNTER — APPOINTMENT (OUTPATIENT)
Dept: CARDIOLOGY | Facility: CLINIC | Age: 85
End: 2025-05-23
Payer: MEDICARE

## 2025-06-19 ENCOUNTER — APPOINTMENT (OUTPATIENT)
Dept: CARDIOLOGY | Facility: CLINIC | Age: 85
End: 2025-06-19
Payer: MEDICARE

## 2025-06-19 VITALS
DIASTOLIC BLOOD PRESSURE: 72 MMHG | BODY MASS INDEX: 26.76 KG/M2 | OXYGEN SATURATION: 97 % | SYSTOLIC BLOOD PRESSURE: 136 MMHG | RESPIRATION RATE: 18 BRPM | HEIGHT: 75 IN | WEIGHT: 215.2 LBS | HEART RATE: 67 BPM

## 2025-06-19 DIAGNOSIS — I95.1 ORTHOSTATIC HYPOTENSION: ICD-10-CM

## 2025-06-19 DIAGNOSIS — G30.9 MIXED DEMENTIA (MULTI): ICD-10-CM

## 2025-06-19 DIAGNOSIS — E78.5 DYSLIPIDEMIA: ICD-10-CM

## 2025-06-19 DIAGNOSIS — I48.0 PAROXYSMAL ATRIAL FIBRILLATION (MULTI): ICD-10-CM

## 2025-06-19 DIAGNOSIS — N40.0 BENIGN PROSTATIC HYPERPLASIA WITHOUT LOWER URINARY TRACT SYMPTOMS: ICD-10-CM

## 2025-06-19 DIAGNOSIS — F51.01 PRIMARY INSOMNIA: ICD-10-CM

## 2025-06-19 DIAGNOSIS — E11.9 TYPE 2 DIABETES MELLITUS WITHOUT COMPLICATION, WITHOUT LONG-TERM CURRENT USE OF INSULIN: ICD-10-CM

## 2025-06-19 DIAGNOSIS — F02.80 MIXED DEMENTIA (MULTI): ICD-10-CM

## 2025-06-19 DIAGNOSIS — I10 PRIMARY HYPERTENSION: Primary | ICD-10-CM

## 2025-06-19 DIAGNOSIS — F01.50 MIXED DEMENTIA (MULTI): ICD-10-CM

## 2025-06-19 PROCEDURE — 3078F DIAST BP <80 MM HG: CPT | Performed by: INTERNAL MEDICINE

## 2025-06-19 PROCEDURE — 1159F MED LIST DOCD IN RCRD: CPT | Performed by: INTERNAL MEDICINE

## 2025-06-19 PROCEDURE — 1123F ACP DISCUSS/DSCN MKR DOCD: CPT | Performed by: INTERNAL MEDICINE

## 2025-06-19 PROCEDURE — 3075F SYST BP GE 130 - 139MM HG: CPT | Performed by: INTERNAL MEDICINE

## 2025-06-19 PROCEDURE — G2211 COMPLEX E/M VISIT ADD ON: HCPCS | Performed by: INTERNAL MEDICINE

## 2025-06-19 PROCEDURE — 99214 OFFICE O/P EST MOD 30 MIN: CPT | Performed by: INTERNAL MEDICINE

## 2025-06-19 PROCEDURE — 1036F TOBACCO NON-USER: CPT | Performed by: INTERNAL MEDICINE

## 2025-06-19 RX ORDER — DONEPEZIL HYDROCHLORIDE 10 MG/1
TABLET, FILM COATED ORAL
Qty: 90 TABLET | Refills: 1 | Status: SHIPPED | OUTPATIENT
Start: 2025-06-19

## 2025-06-19 RX ORDER — TRAZODONE HYDROCHLORIDE 50 MG/1
50 TABLET ORAL NIGHTLY
Qty: 90 TABLET | Refills: 1 | Status: SHIPPED | OUTPATIENT
Start: 2025-06-19

## 2025-06-19 RX ORDER — TAMSULOSIN HYDROCHLORIDE 0.4 MG/1
CAPSULE ORAL
Qty: 90 CAPSULE | Refills: 1 | Status: SHIPPED | OUTPATIENT
Start: 2025-06-19

## 2025-06-19 RX ORDER — PIOGLITAZONE 15 MG/1
15 TABLET ORAL DAILY
Qty: 90 TABLET | Refills: 1 | Status: SHIPPED | OUTPATIENT
Start: 2025-06-19

## 2025-06-19 NOTE — TELEPHONE ENCOUNTER
PATIENT NOW NEEDS TO HAVE SCRIPTS SENT TO HIS LOCAL PHARMACY FOR A 90 DAY    Rx Refill Request Telephone Encounter    Name:  Dakota Manzano  : 1940     tamsulosin (Flomax) 0.4 mg 24 hr capsule [[id number]]    Order Details  Dose, Route, Frequency: As Directed   Dispense Quantity: 90 capsule Refills: 1    Duration: -- Dispense As Written: No          Sig: Take 1 capsule by mouth daily.     donepezil (Aricept) 10 mg tablet [[id number]]    Order Details    Dose, Route, Frequency: As Directed   Dispense Quantity: 90 tablet Refills: 1    Duration: -- Dispense As Written: No          Si tab daily           pioglitazone (Actos) 15 mg tablet [[id number]]    Order Details  Dose: 15 mg Route: oral Frequency: Daily   Dispense Quantity: 90 tablet (90 day supply) Refills: 1    Duration: -- Dispense As Written: No          Sig: Take 1 tablet (15 mg) by mouth once daily.     traZODone (Desyrel) 50 mg tablet [[id number]]    Order Details  Dose: 50 mg Route: oral Frequency: Nightly   Dispense Quantity: 90 tablet (90 day supply) Refills: 1    Duration: -- Dispense As Written: No          Sig: Take 1 tablet (50 mg) by mouth once daily at bedtime       ALLERGIES:  SEE LIST    Specific Pharmacy location:  [facility]    Date of last appointment:  2025  Date of next appointment:  10/22/2025    Best number to reach patient:  [phone]

## 2025-06-19 NOTE — PROGRESS NOTES
Referred by Dr. Nieto for See Below     History Of Present Illness:    Dakota Manzano is a 84 y.o. male presenting with PAF.    The patient has a history of dementia, and his daughter accompanies him at today's visit.    This hypertensive, diabetic, hyperlipidemic 84-year-old man has a history of paroxysmal atrial fibrillation and returns to the office in routine follow-up.  His echocardiogram dated September 27, 2023 revealed an ejection fraction of 55 to 60% with no significant valvular or pericardial disease.  His monitor study in October 2023 disclosed low-volume paroxysmal atrial fibrillation.     Since the last visit here, the patient denies chest pain, shortness of breath, palpitations, orthopnea, PND, syncope.He rides his stationary bike for about an hour a day.      The patient denies bleeding problems on anticoagulation.    He salts his food.  BP is slightly elevated..    Past Medical History:  He has a past medical history of Anesthesia of skin (01/11/2022), Basal cell carcinoma, Body mass index (BMI) 27.0-27.9, adult (06/07/2021), Disorder of the skin and subcutaneous tissue, unspecified (09/21/2020), Encounter for general adult medical examination without abnormal findings (10/27/2021), Encounter for general adult medical examination without abnormal findings (09/21/2020), Encounter for immunization (10/27/2021), Encounter for removal of sutures (04/05/2021), Encounter for screening for malignant neoplasm of prostate, Encounter for screening for malignant neoplasm of prostate, Ingrowing nail (10/18/2019), Laceration without foreign body of other part of head, initial encounter (04/05/2021), Other conditions influencing health status, Personal history of other diseases of the nervous system and sense organs, Personal history of other drug therapy (09/21/2020), Syncope and collapse (01/11/2022), Tinea unguium (10/18/2019), and Type 2 diabetes mellitus without complications (04/08/2019).    Past  Surgical History:  He has a past surgical history that includes Other surgical history (06/11/2019); Other surgical history (06/11/2019); and Other surgical history (06/11/2019).      Social History:  He reports that he quit smoking about 63 years ago. His smoking use included cigarettes. He quit smokeless tobacco use about 63 years ago.  His smokeless tobacco use included chew. He reports that he does not currently use alcohol. He reports that he does not use drugs.    Family History:  Family History[1]     Allergies:  Tetanus antitoxin and Tetanus vaccines and toxoid    Outpatient Medications:  Current Outpatient Medications   Medication Instructions    apixaban (ELIQUIS) 2.5 mg, oral, 2 times daily    ascorbic acid (VITAMIN C) 1,000 mg, Daily    cholecalciferol-soy isoflavone 2,000-64 unit-mg tablet 1 Units, Daily    cyanocobalamin-cobamamide (B-12 Plus) 5,000-100 mcg tablet, sublingual 1 tablet, sublingual, Daily    docusate sodium (Colace) 100 mg capsule 1 capsule 2 TIMES DAILY (route: oral)    donepezil (Aricept) 10 mg tablet 1 tab daily    gabapentin (Neurontin) 300 mg capsule 1 capsule at bedtime for a week, then 1 capsule BID for a week, then 1 capsule TID    hydroCHLOROthiazide (MICROZIDE) 12.5 mg, oral, Daily    levothyroxine (SYNTHROID, LEVOXYL) 100 mcg, oral, Daily, Take on an empty stomach at the same time each day, either 30 to 60 minutes prior to breakfast    methylPREDNISolone (Medrol Dospak) 4 mg tablets Take as directed on package.    omega 3-dha-epa-fish oil (Fish OiL) 1,200 (144-216) mg capsule 1 capsule, Daily    pioglitazone (ACTOS) 15 mg, oral, Daily    pravastatin (PRAVACHOL) 10 mg, oral, Daily    tamsulosin (Flomax) 0.4 mg 24 hr capsule Take 1 capsule by mouth daily.    traZODone (DESYREL) 50 mg, oral, Nightly        Last Recorded Vitals:  Vitals:    06/19/25 1445   BP: 136/72   BP Location: Left arm   Patient Position: Sitting   Pulse: 67   Resp: 18   SpO2: 97%   Weight: 97.6 kg (215 lb  "3.2 oz)   Height: 1.905 m (6' 3\")       Physical Exam:  GENERAL:  pleasant 84 year-old  HEENT: No xanthelasma  NECK: Supple, no palpable adenopathy or thyromegaly  CHEST: Clear to auscultation, respiratory effort unlabored  CARDIAC: Intermittently irregular, normal S1 and S2, no audible murmur, rub, gallop, carotids are brisk, PMI is not displaced  ABD: Active bowel sounds, nontender, no organomegaly, no evidence of ascites  EXT: No clubbing, cyanosis, edema, or tenderness  NEURO: Awake, alert, appropriate, speech is fluent         Last Labs:  CBC -  Lab Results   Component Value Date    WBC 6.7 09/25/2024    HGB 12.1 (L) 09/25/2024    HCT 39.7 (L) 09/25/2024    MCV 81 09/25/2024     09/25/2024       CMP -  Lab Results   Component Value Date    CALCIUM 9.9 04/30/2025    PROT 6.9 09/25/2024    ALBUMIN 4.3 09/25/2024    AST 27 09/25/2024    ALT 23 09/25/2024    ALKPHOS 71 09/25/2024    BILITOT 0.9 09/25/2024       LIPID PANEL -   Lab Results   Component Value Date    CHOL 199 09/25/2024    TRIG 247 (H) 09/25/2024    HDL 32.6 09/25/2024    CHHDL 6.1 09/25/2024    LDLF 122 (H) 08/15/2023    VLDL 49 (H) 09/25/2024    NHDL 166 (H) 09/25/2024       RENAL FUNCTION PANEL -   Lab Results   Component Value Date    GLUCOSE 134 (H) 04/30/2025     04/30/2025    K 4.1 04/30/2025    CL 97 (L) 04/30/2025    CO2 28 04/30/2025    ANIONGAP 14 04/30/2025    BUN 40 (H) 04/30/2025    CREATININE 1.00 04/30/2025    GFRMALE 55 (A) 08/15/2023    CALCIUM 9.9 04/30/2025    ALBUMIN 4.3 09/25/2024        Lab Results   Component Value Date    HGBA1C 7.5 (H) 04/30/2025         Lab review: I have Chemistry CMP:   Lab Results   Component Value Date    ALBUMIN 4.3 09/25/2024    CALCIUM 9.9 04/30/2025    CO2 28 04/30/2025    CREATININE 1.00 04/30/2025    GLUCOSE 134 (H) 04/30/2025    BILITOT 0.9 09/25/2024    PROT 6.9 09/25/2024    ALT 23 09/25/2024    AST 27 09/25/2024    ALKPHOS 71 09/25/2024   , Chemistry BMP   Lab Results   Component " Value Date    GLUCOSE 134 (H) 2025    CALCIUM 9.9 2025    CO2 28 2025    CREATININE 1.00 2025   , CBC:  Lab Results   Component Value Date    WBC 6.7 2024    RBC 4.92 2024    HGB 12.1 (L) 2024    HCT 39.7 (L) 2024    MCV 81 2024    MCH 24.6 (L) 2024    MCHC 30.5 (L) 2024    RDW 16.5 (H) 2024    NRBC 0.0 2024   , and Lipids:   Lab Results   Component Value Date    CHOL 199 2024    HDL 32.6 2024    LDLCALC 117 (H) 2024    TRIG 247 (H) 2024       Assessment/Plan   Assessment & Plan  Paroxysmal atrial fibrillation (Multi)    Orders:    Follow Up In Cardiology    Follow Up In Cardiology; Future    Orthostatic hypotension    Orders:    Follow Up In Cardiology    Primary hypertension    Orders:    Follow Up In Cardiology; Future    Dyslipidemia    Orders:    Follow Up In Cardiology; Future            Zheng Nieto MD         [1]   Family History  Problem Relation Name Age of Onset    Retinal degeneration Mother      Other () Mother      Dementia Mother      Parkinsonism Mother      Other () Father      Tremor Father      Other () Brother      Heart attack Brother      Tremor Brother      Tremor Paternal Grandfather      Heart disease Other Maternal Relatives     Intellectual Disability Grandchild

## 2025-07-07 DIAGNOSIS — I48.0 PAROXYSMAL ATRIAL FIBRILLATION (MULTI): ICD-10-CM

## 2025-07-07 NOTE — TELEPHONE ENCOUNTER
Patient's daughter, Latosha Luna (on HIPAA) is calling to request a RX refill on his Eliquis 2.5 mg, he is OUT    Rx Refill Request Telephone Encounter    Name:  Dakota Manzano  : 1940     Medication Name:  Eliquis  Dose (Optional):    2.5 mg  Quantity (Optional):    180 tablet (90 day supply)  Directions (Optional):   Take 1 tablet (2.5 mg) by mouth 2 times a day     ALLERGIES:   see list     Specific Pharmacy location:  [facility] [place]    Date of last appointment:  25  Date of next appointment:  10/22/25    Best number to reach patient:  [phone]

## 2025-08-21 DIAGNOSIS — E03.4 HYPOTHYROIDISM DUE TO ACQUIRED ATROPHY OF THYROID: ICD-10-CM

## 2025-08-21 DIAGNOSIS — I10 ESSENTIAL HYPERTENSION: ICD-10-CM

## 2025-08-21 DIAGNOSIS — E78.2 MIXED HYPERLIPIDEMIA: ICD-10-CM

## 2025-08-21 DIAGNOSIS — F51.01 PRIMARY INSOMNIA: ICD-10-CM

## 2025-08-21 RX ORDER — TRAZODONE HYDROCHLORIDE 50 MG/1
50 TABLET ORAL NIGHTLY
Qty: 90 TABLET | Refills: 1 | Status: SHIPPED | OUTPATIENT
Start: 2025-08-21

## 2025-08-21 RX ORDER — PRAVASTATIN SODIUM 10 MG/1
10 TABLET ORAL DAILY
Qty: 90 TABLET | Refills: 1 | Status: SHIPPED | OUTPATIENT
Start: 2025-08-21

## 2025-08-21 RX ORDER — HYDROCHLOROTHIAZIDE 12.5 MG/1
12.5 TABLET ORAL DAILY
Qty: 90 TABLET | Refills: 1 | Status: SHIPPED | OUTPATIENT
Start: 2025-08-21

## 2025-08-21 RX ORDER — LEVOTHYROXINE SODIUM 100 UG/1
100 TABLET ORAL DAILY
Qty: 90 TABLET | Refills: 1 | Status: SHIPPED | OUTPATIENT
Start: 2025-08-21 | End: 2026-02-17

## 2025-09-03 ENCOUNTER — OFFICE VISIT (OUTPATIENT)
Dept: PRIMARY CARE | Facility: CLINIC | Age: 85
End: 2025-09-03
Payer: MEDICARE

## 2025-09-03 VITALS
DIASTOLIC BLOOD PRESSURE: 72 MMHG | WEIGHT: 206.3 LBS | OXYGEN SATURATION: 95 % | SYSTOLIC BLOOD PRESSURE: 126 MMHG | BODY MASS INDEX: 25.79 KG/M2 | HEART RATE: 74 BPM

## 2025-09-03 DIAGNOSIS — E11.9 TYPE 2 DIABETES MELLITUS WITHOUT COMPLICATION, WITHOUT LONG-TERM CURRENT USE OF INSULIN: ICD-10-CM

## 2025-09-03 DIAGNOSIS — I48.0 PAROXYSMAL ATRIAL FIBRILLATION (MULTI): ICD-10-CM

## 2025-09-03 DIAGNOSIS — N18.30 CHRONIC KIDNEY DISEASE (CKD), ACTIVE MEDICAL MANAGEMENT WITHOUT DIALYSIS, STAGE 3 (MODERATE) (MULTI): ICD-10-CM

## 2025-09-03 DIAGNOSIS — E03.4 HYPOTHYROIDISM DUE TO ACQUIRED ATROPHY OF THYROID: ICD-10-CM

## 2025-09-03 DIAGNOSIS — N40.0 BENIGN PROSTATIC HYPERPLASIA WITHOUT LOWER URINARY TRACT SYMPTOMS: ICD-10-CM

## 2025-09-03 DIAGNOSIS — G20.C PARKINSONISM, UNSPECIFIED PARKINSONISM TYPE (MULTI): ICD-10-CM

## 2025-09-03 DIAGNOSIS — E78.00 HYPERCHOLESTEROLEMIA: ICD-10-CM

## 2025-09-03 DIAGNOSIS — R55 SYNCOPE, UNSPECIFIED SYNCOPE TYPE: Primary | ICD-10-CM

## 2025-09-03 PROCEDURE — 1159F MED LIST DOCD IN RCRD: CPT | Performed by: FAMILY MEDICINE

## 2025-09-03 PROCEDURE — 3074F SYST BP LT 130 MM HG: CPT | Performed by: FAMILY MEDICINE

## 2025-09-03 PROCEDURE — 3078F DIAST BP <80 MM HG: CPT | Performed by: FAMILY MEDICINE

## 2025-09-03 PROCEDURE — 99214 OFFICE O/P EST MOD 30 MIN: CPT | Performed by: FAMILY MEDICINE

## 2025-10-01 ENCOUNTER — APPOINTMENT (OUTPATIENT)
Dept: PRIMARY CARE | Facility: CLINIC | Age: 85
End: 2025-10-01
Payer: MEDICARE

## 2025-10-22 ENCOUNTER — APPOINTMENT (OUTPATIENT)
Dept: PRIMARY CARE | Facility: CLINIC | Age: 85
End: 2025-10-22
Payer: MEDICARE

## 2026-06-22 ENCOUNTER — APPOINTMENT (OUTPATIENT)
Dept: CARDIOLOGY | Facility: CLINIC | Age: 86
End: 2026-06-22
Payer: MEDICARE